# Patient Record
Sex: FEMALE | Race: NATIVE HAWAIIAN OR OTHER PACIFIC ISLANDER | Employment: UNEMPLOYED | URBAN - METROPOLITAN AREA
[De-identification: names, ages, dates, MRNs, and addresses within clinical notes are randomized per-mention and may not be internally consistent; named-entity substitution may affect disease eponyms.]

---

## 2017-01-04 ENCOUNTER — TELEPHONE (OUTPATIENT)
Dept: OBGYN CLINIC | Facility: CLINIC | Age: 27
End: 2017-01-04

## 2017-01-04 DIAGNOSIS — O24.419 GESTATIONAL DIABETES MELLITUS (GDM) IN SECOND TRIMESTER, GESTATIONAL DIABETES METHOD OF CONTROL UNSPECIFIED: Primary | ICD-10-CM

## 2017-01-04 NOTE — TELEPHONE ENCOUNTER
RC  And has questions for nurse okay to lv  PTS # [phone].      Also pt states she needs to spk with nurse about a referral - ( Dr. Mario Ricks Fax number # [phone] and ph # to office [phone] )

## 2017-01-04 NOTE — TELEPHONE ENCOUNTER
Pt informed of below and verbalized understanding. Pt states she made an appt with optho and needs a referral. Dr Rip Cheney is below. Routed to referral RNs.

## 2017-01-04 NOTE — TELEPHONE ENCOUNTER
LMTCB. Pt needs to be informed OLENA reviewed BS and BPs. Pt needs to be informed no change and fax by Monday to keep track of FBS.

## 2017-01-05 ENCOUNTER — TELEPHONE (OUTPATIENT)
Dept: PERINATAL CARE | Facility: HOSPITAL | Age: 27
End: 2017-01-05

## 2017-01-05 NOTE — TELEPHONE ENCOUNTER
PT STATES SHE CHOSE DR. PANCHAL BECAUSE NONE OF THE DRS HERE GAVE HER ANY SPECIFIC NAME. SHE CALLED HER INSURANCE TO FIND A NAME. DISCUSSED SEEING DR. Rylee Gonzalez HERE AT [facility]. PT DOES PREFER THIS.   SHE IS AWARE WE WILL PUT REFERRAL IN PLACE AND SHE CAN CALL T

## 2017-01-05 NOTE — TELEPHONE ENCOUNTER
Phone call to [facility] to request blood glucose log for MFM to review. [facility] states she will fax log tomorrow.

## 2017-01-07 ENCOUNTER — TELEPHONE (OUTPATIENT)
Dept: OBGYN CLINIC | Facility: CLINIC | Age: 27
End: 2017-01-07

## 2017-01-07 NOTE — TELEPHONE ENCOUNTER
Pt notified B/P and BS log has been reviewed by the doctor and increased the dinner and bedtime insulin as follows: 0 + 0 + 6 + 16 NPH. Pt instructed to fax next BS log in 3-4 days.

## 2017-01-09 ENCOUNTER — TELEPHONE (OUTPATIENT)
Dept: PERINATAL CARE | Facility: HOSPITAL | Age: 27
End: 2017-01-09

## 2017-01-09 NOTE — TELEPHONE ENCOUNTER
Dr. Swati Alvarez reviewed blood glucose log from 1/1/17 -1/6/17  Would like Marcy Cortes to increase her HS Insulin to 16 units of NPH  Angelita contacted by phone. Informed her of Dr. Timmy Scheuermann recommendation. Verbalized understanding and agrees with plan.   Will send log

## 2017-01-10 ENCOUNTER — OFFICE VISIT (OUTPATIENT)
Dept: OPTOMETRY | Facility: CLINIC | Age: 27
End: 2017-01-10

## 2017-01-10 DIAGNOSIS — O24.419 GESTATIONAL DIABETES MELLITUS (GDM) AFFECTING PREGNANCY: Primary | ICD-10-CM

## 2017-01-10 PROCEDURE — 92004 COMPRE OPH EXAM NEW PT 1/>: CPT | Performed by: OPTOMETRIST

## 2017-01-10 NOTE — PROGRESS NOTES
Ramya Bangura is a 31year old female. HPI:     HPI     Diabetic Eye Exam   Diabetes characteristics include Type 2, controlled with diet and on insulin. Duration of 6 months. Comments   Patient is in for a DFE.  Patient developed diabetes at th Rfl:    Insulin NPH, Human,, Isophane, 100 UNIT/ML Subcutaneous Suspension Inject 2 Units into the skin nightly. Disp: 1 vial Rfl: PRN   Insulin Lispro (HUMALOG) 100 UNIT/ML Subcutaneous Solution Inject 2 Units into the skin Before Dinner.  Disp: 1 vial Rfl Wearing Rx      Sphere Cylinder Axis   Right -5.25 -0.50 175   Left -4.75 -0.25 180         Manifest Refraction (Auto)      Sphere Cylinder Axis   Right -5.00 -0.50 180   Left -4.25 -0.50 180                    ASSESSMENT/PLAN:     Diagnoses and Plan:

## 2017-01-10 NOTE — PATIENT INSTRUCTIONS
Gestational diabetes mellitus (GDM) affecting pregnancy  I advised patient that there is no background diabetic retinopathy in either eye and that they should continue to keep their blood sugar under control and continue to see their physician as directed.

## 2017-01-10 NOTE — ASSESSMENT & PLAN NOTE
I advised patient that there is no background diabetic retinopathy in either eye and that they should continue to keep their blood sugar under control and continue to see their physician as directed. I stressed the importance of yearly diabetic eye exams.

## 2017-01-16 ENCOUNTER — ROUTINE PRENATAL (OUTPATIENT)
Dept: OBGYN CLINIC | Facility: CLINIC | Age: 27
End: 2017-01-16

## 2017-01-16 VITALS
WEIGHT: 214 LBS | HEART RATE: 98 BPM | DIASTOLIC BLOOD PRESSURE: 84 MMHG | BODY MASS INDEX: 40 KG/M2 | SYSTOLIC BLOOD PRESSURE: 126 MMHG

## 2017-01-16 DIAGNOSIS — Z34.03 ENCOUNTER FOR SUPERVISION OF NORMAL FIRST PREGNANCY IN THIRD TRIMESTER: Primary | ICD-10-CM

## 2017-01-16 LAB
APPEARANCE: CLEAR
MULTISTIX LOT#: NORMAL NUMERIC
URINE-COLOR: YELLOW

## 2017-01-18 NOTE — PROGRESS NOTES
Wishes for TDAP at next visit. Increase insulin to 0+0+6+18N. Next growth u/s 1/30. Fax sugars every 3-4 days.  RTC 2 wks

## 2017-01-19 ENCOUNTER — TELEPHONE (OUTPATIENT)
Dept: PERINATAL CARE | Facility: HOSPITAL | Age: 27
End: 2017-01-19

## 2017-01-19 NOTE — TELEPHONE ENCOUNTER
Jonny Celestin called at home, requested copy of her blood glucose log for Dr. Ravi Solano to review. She will email the log today. Again requested that she email the log weekly. Patient agreed.   States she is feeling well

## 2017-01-20 ENCOUNTER — TELEPHONE (OUTPATIENT)
Dept: OBGYN CLINIC | Facility: CLINIC | Age: 27
End: 2017-01-20

## 2017-01-20 RX ORDER — INSULIN ASPART 100 [IU]/ML
6 INJECTION, SOLUTION INTRAVENOUS; SUBCUTANEOUS
Qty: 1 VIAL | Status: ON HOLD
Start: 2017-01-20 | End: 2017-03-23

## 2017-01-20 RX ORDER — BLOOD SUGAR DIAGNOSTIC
STRIP MISCELLANEOUS
Qty: 1 BOX | Status: SHIPPED | OUTPATIENT
Start: 2017-01-20 | End: 2017-05-03 | Stop reason: ALTCHOICE

## 2017-01-20 RX ORDER — INSULIN ASPART 100 [IU]/ML
6 INJECTION, SOLUTION INTRAVENOUS; SUBCUTANEOUS
Qty: 1 VIAL | Status: SHIPPED | OUTPATIENT
Start: 2017-01-20 | End: 2017-01-20

## 2017-01-20 NOTE — TELEPHONE ENCOUNTER
LEFT MESSAGE AT HOME # THAT REFILL AUTHORIZATIONS WERE FAXED TO HER PHARMACY BUT TO CALL IF THERE ARE ANY PROBLEMS.

## 2017-01-20 NOTE — TELEPHONE ENCOUNTER
Pt is requesting a redill on novalg and Novolin, and syringes pls note she  needs the dosage up date it, and a 90 day supply

## 2017-01-24 ENCOUNTER — TELEPHONE (OUTPATIENT)
Dept: PERINATAL CARE | Facility: HOSPITAL | Age: 27
End: 2017-01-24

## 2017-01-26 ENCOUNTER — TELEPHONE (OUTPATIENT)
Dept: OBGYN CLINIC | Facility: CLINIC | Age: 27
End: 2017-01-26

## 2017-01-26 NOTE — TELEPHONE ENCOUNTER
Pt informed KCB reviewed BS log and BP log and increased insulin regimen to 0 + 0 + 6 + 20N. Pt instructed to fax or call next BS and BP log in 3-4 days. Pt verbalized understanding.

## 2017-01-26 NOTE — TELEPHONE ENCOUNTER
Results received and placed on [facility] desk for review

## 2017-01-30 ENCOUNTER — TELEPHONE (OUTPATIENT)
Dept: OBGYN CLINIC | Facility: CLINIC | Age: 27
End: 2017-01-30

## 2017-01-30 ENCOUNTER — HOSPITAL ENCOUNTER (OUTPATIENT)
Dept: PERINATAL CARE | Facility: HOSPITAL | Age: 27
Discharge: HOME | End: 2017-01-30
Attending: OBSTETRICS & GYNECOLOGY
Payer: COMMERCIAL

## 2017-01-30 ENCOUNTER — ROUTINE PRENATAL (OUTPATIENT)
Dept: OBGYN CLINIC | Facility: CLINIC | Age: 27
End: 2017-01-30

## 2017-01-30 VITALS
WEIGHT: 214 LBS | BODY MASS INDEX: 40 KG/M2 | RESPIRATION RATE: 18 BRPM | HEART RATE: 90 BPM | SYSTOLIC BLOOD PRESSURE: 129 MMHG | DIASTOLIC BLOOD PRESSURE: 93 MMHG

## 2017-01-30 VITALS
SYSTOLIC BLOOD PRESSURE: 129 MMHG | BODY MASS INDEX: 41 KG/M2 | HEART RATE: 106 BPM | DIASTOLIC BLOOD PRESSURE: 90 MMHG | WEIGHT: 219 LBS

## 2017-01-30 DIAGNOSIS — O24.419 GESTATIONAL DIABETES MELLITUS (GDM) AFFECTING PREGNANCY: ICD-10-CM

## 2017-01-30 DIAGNOSIS — Z34.93 ENCOUNTER FOR SUPERVISION OF NORMAL PREGNANCY IN THIRD TRIMESTER, UNSPECIFIED GRAVIDITY: Primary | ICD-10-CM

## 2017-01-30 DIAGNOSIS — Z34.93: ICD-10-CM

## 2017-01-30 DIAGNOSIS — O10.919 CHRONIC HYPERTENSION AFFECTING PREGNANCY: Primary | ICD-10-CM

## 2017-01-30 DIAGNOSIS — O10.919 CHRONIC HYPERTENSION AFFECTING PREGNANCY: ICD-10-CM

## 2017-01-30 DIAGNOSIS — O99.213 OBESITY AFFECTING PREGNANCY, ANTEPARTUM, THIRD TRIMESTER: ICD-10-CM

## 2017-01-30 DIAGNOSIS — IMO0001 INSULIN DEPENDENT GESTATIONAL DIABETES MELLITUS (GDM), ANTEPARTUM: ICD-10-CM

## 2017-01-30 LAB
MULTISTIX LOT#: NORMAL NUMERIC
PH, URINE: 6 (ref 4.5–8)
SPECIFIC GRAVITY: 1.03 (ref 1–1.03)
UROBILINOGEN,SEMI-QN: 0.2 MG/DL (ref 0–1.9)

## 2017-01-30 PROCEDURE — 76805 OB US >/= 14 WKS SNGL FETUS: CPT | Performed by: OBSTETRICS & GYNECOLOGY

## 2017-01-30 PROCEDURE — 76805 OB US >/= 14 WKS SNGL FETUS: CPT

## 2017-01-30 PROCEDURE — 99214 OFFICE O/P EST MOD 30 MIN: CPT | Performed by: OBSTETRICS & GYNECOLOGY

## 2017-01-30 PROCEDURE — 76819 FETAL BIOPHYS PROFIL W/O NST: CPT | Performed by: OBSTETRICS & GYNECOLOGY

## 2017-01-30 NOTE — TELEPHONE ENCOUNTER
Reviewed pt recs on prob list after her visit and there is a mention of NST's at 32 weeks and at 36 weeks- please advise her that we would like for her to start at 32 weeks due to DM and HTN. Please give her info to schedule starting at 32 weeks.

## 2017-01-30 NOTE — TELEPHONE ENCOUNTER
Please clarify. Does pt need weekly NST starting at 32wks or starting at 36wks, or growth u/s at 32 and 36 wks.

## 2017-01-30 NOTE — PROGRESS NOTES
MFM follow up outpatient consultation -  Obesity; chronic HTN, probable pre-existing diabetes    S: no HA, no swelling, good FM.  No low blood sugars.   We discussed that if she has recurrent mildly elevated blood sugars after lunch, therefore I recommend implications associated with chronic hypertension.  She expressed understanding that chronic hypertension increases the risk of pre-eclampsia, placental abruption, IUGR and fetal demise and possible need for  delivery.  The signs and symptoms of pre have higher rates of small for gestational age infants. Jose Angel Austin had her questions answered to her satisfaction.      IMPRESSION:   1. IUP at 30w1d    2. Normal fetal growth and anatomy  3. Maternal obesity complicating pregnancy (BMI 38.9)  4.  Hypertens

## 2017-01-30 NOTE — PROGRESS NOTES
MFM growth ultrasound. Patient is A2GDM. Blood glucose log given to Dr. Shanell Mathews. Good fetal movement.

## 2017-01-30 NOTE — PROGRESS NOTES
0+2+6+20N- lunch BS's values elevated, start NST's at 32 weeks, fax BS's in 4 days, had DMG US this afternoon- results pending

## 2017-01-31 NOTE — TELEPHONE ENCOUNTER
Magui WALLER on [facility] notified that pt will be coming for weekly NSTs starting at 32 weeks. Pt informed of recs below and verbalized understanding.  Pt stated she is driving right now and would like a message sent to her via [employer] with the phone number for [facility]

## 2017-02-14 ENCOUNTER — APPOINTMENT (OUTPATIENT)
Dept: OBGYN CLINIC | Facility: HOSPITAL | Age: 27
End: 2017-02-14
Payer: COMMERCIAL

## 2017-02-14 ENCOUNTER — HOSPITAL ENCOUNTER (OUTPATIENT)
Facility: HOSPITAL | Age: 27
Discharge: HOME | End: 2017-02-14
Attending: OBSTETRICS & GYNECOLOGY | Admitting: OBSTETRICS & GYNECOLOGY
Payer: COMMERCIAL

## 2017-02-14 ENCOUNTER — ROUTINE PRENATAL (OUTPATIENT)
Dept: OBGYN CLINIC | Facility: CLINIC | Age: 27
End: 2017-02-14

## 2017-02-14 VITALS
DIASTOLIC BLOOD PRESSURE: 89 MMHG | SYSTOLIC BLOOD PRESSURE: 138 MMHG | HEART RATE: 114 BPM | WEIGHT: 221 LBS | BODY MASS INDEX: 42 KG/M2

## 2017-02-14 VITALS
DIASTOLIC BLOOD PRESSURE: 90 MMHG | SYSTOLIC BLOOD PRESSURE: 138 MMHG | RESPIRATION RATE: 18 BRPM | HEART RATE: 93 BPM | TEMPERATURE: 99 F

## 2017-02-14 DIAGNOSIS — Z34.03 ENCOUNTER FOR SUPERVISION OF NORMAL FIRST PREGNANCY IN THIRD TRIMESTER: Primary | ICD-10-CM

## 2017-02-14 LAB
GLUCOSE (URINE DIPSTICK): 2000 MG/DL
MULTISTIX LOT#: NORMAL NUMERIC

## 2017-02-14 PROCEDURE — 90715 TDAP VACCINE 7 YRS/> IM: CPT | Performed by: OBSTETRICS & GYNECOLOGY

## 2017-02-14 PROCEDURE — 59025 FETAL NON-STRESS TEST: CPT | Performed by: OBSTETRICS & GYNECOLOGY

## 2017-02-14 PROCEDURE — 90471 IMMUNIZATION ADMIN: CPT | Performed by: OBSTETRICS & GYNECOLOGY

## 2017-02-14 NOTE — PROGRESS NOTES
No issues reported. To [facility] for NST now. Continue q monthly growth. BS log reviewed. Will inc to 0+2+8+22N. BP log reviewed and 120s/80-80-90s. RTC 2 wks. Continue BP and BS log q3-4d. Patient to send in via my chart. TDAP today. RTC 2wks.

## 2017-02-15 NOTE — NST
Nonstress Test   Patient: Anna Marie Lennon    Gestation: 32w2d    NST:       Variability: Moderate           Accelerations: Yes           Decelerations: Variable            Baseline: 130 BPM           Uterine Irritability: No           Contractions: Not present

## 2017-02-17 ENCOUNTER — TELEPHONE (OUTPATIENT)
Dept: PERINATAL CARE | Facility: HOSPITAL | Age: 27
End: 2017-02-17

## 2017-02-19 NOTE — PROGRESS NOTES
NST note:    Indication: Gestational Diabetes    FHT baseline: 130    Variability: moderate    Accels:  present    Decels: No    Tocos:  Yes    Category: 1 tracing    Plan: Weekly NST's

## 2017-02-20 ENCOUNTER — HOSPITAL ENCOUNTER (OUTPATIENT)
Facility: HOSPITAL | Age: 27
Discharge: HOME | End: 2017-02-20
Attending: OBSTETRICS & GYNECOLOGY | Admitting: OBSTETRICS & GYNECOLOGY
Payer: COMMERCIAL

## 2017-02-20 ENCOUNTER — TELEPHONE (OUTPATIENT)
Dept: OBGYN CLINIC | Facility: CLINIC | Age: 27
End: 2017-02-20

## 2017-02-20 ENCOUNTER — APPOINTMENT (OUTPATIENT)
Dept: OBGYN CLINIC | Facility: HOSPITAL | Age: 27
End: 2017-02-20
Payer: COMMERCIAL

## 2017-02-20 VITALS — RESPIRATION RATE: 16 BRPM | HEART RATE: 97 BPM | DIASTOLIC BLOOD PRESSURE: 83 MMHG | SYSTOLIC BLOOD PRESSURE: 128 MMHG

## 2017-02-20 PROCEDURE — 59025 FETAL NON-STRESS TEST: CPT | Performed by: OBSTETRICS & GYNECOLOGY

## 2017-02-20 NOTE — NST
Nonstress Test   Patient: Tonie Bob    Gestation: 33w1d    NST: chronic htn, A2GDM, High BMI,        Variability: Moderate           Accelerations: Yes           Decelerations: None            Baseline: 133 BPM           Uterine Irritability: No

## 2017-02-20 NOTE — TELEPHONE ENCOUNTER
Likely from shaving. Warm compress. Avoid shaving and deodorant.   If itching  Okay for 1% hydrocortisone cream.

## 2017-02-20 NOTE — TELEPHONE ENCOUNTER
33w1d c/o of left underarm rash that come on a couple of days ago. Pt describes area as \"being irritated, and raised bumpy rash\" that is in less than 1/2 of her underarm.   Pt states has not been applying deodorant or lotion to area d/t symptoms and has n

## 2017-02-27 ENCOUNTER — PATIENT MESSAGE (OUTPATIENT)
Dept: OBGYN CLINIC | Facility: CLINIC | Age: 27
End: 2017-02-27

## 2017-02-27 NOTE — TELEPHONE ENCOUNTER
From: Haleigh Tapia  To: Niki Estrella DO  Sent: 2/27/2017 9:57 AM CST  Subject: Other    2/20 / N / 90 / 103 / 97 / 122 // 0 + 2 + 8 + 22N  2/21 / N / 94 / 119 / 102 / 110 // 0 + 2 + 8 + 22N  2/22 / N / 96 /102 / 110 / 126 // 0 + 2 + 8 + 22N  2/23 / N

## 2017-02-28 ENCOUNTER — ROUTINE PRENATAL (OUTPATIENT)
Dept: OBGYN CLINIC | Facility: CLINIC | Age: 27
End: 2017-02-28

## 2017-02-28 ENCOUNTER — HOSPITAL ENCOUNTER (OUTPATIENT)
Facility: HOSPITAL | Age: 27
Discharge: HOME | End: 2017-02-28
Attending: OBSTETRICS & GYNECOLOGY | Admitting: OBSTETRICS & GYNECOLOGY
Payer: COMMERCIAL

## 2017-02-28 ENCOUNTER — HOSPITAL ENCOUNTER (OUTPATIENT)
Dept: PERINATAL CARE | Facility: HOSPITAL | Age: 27
Discharge: HOME | End: 2017-02-28
Attending: OBSTETRICS & GYNECOLOGY
Payer: COMMERCIAL

## 2017-02-28 ENCOUNTER — HOSPITAL ENCOUNTER (OUTPATIENT)
Dept: OBGYN CLINIC | Facility: HOSPITAL | Age: 27
Discharge: HOME | End: 2017-02-28
Payer: COMMERCIAL

## 2017-02-28 VITALS — DIASTOLIC BLOOD PRESSURE: 86 MMHG | HEART RATE: 120 BPM | SYSTOLIC BLOOD PRESSURE: 148 MMHG

## 2017-02-28 VITALS
WEIGHT: 225 LBS | BODY MASS INDEX: 43 KG/M2 | DIASTOLIC BLOOD PRESSURE: 85 MMHG | SYSTOLIC BLOOD PRESSURE: 135 MMHG | HEART RATE: 111 BPM

## 2017-02-28 VITALS — SYSTOLIC BLOOD PRESSURE: 136 MMHG | DIASTOLIC BLOOD PRESSURE: 90 MMHG | HEART RATE: 103 BPM

## 2017-02-28 DIAGNOSIS — O10.919 CHRONIC HYPERTENSION AFFECTING PREGNANCY: ICD-10-CM

## 2017-02-28 DIAGNOSIS — O24.419 GESTATIONAL DIABETES MELLITUS (GDM) AFFECTING PREGNANCY: ICD-10-CM

## 2017-02-28 DIAGNOSIS — O99.213 OBESITY AFFECTING PREGNANCY, ANTEPARTUM, THIRD TRIMESTER: ICD-10-CM

## 2017-02-28 DIAGNOSIS — Z34.83 ENCOUNTER FOR SUPERVISION OF OTHER NORMAL PREGNANCY IN THIRD TRIMESTER: Primary | ICD-10-CM

## 2017-02-28 DIAGNOSIS — IMO0001 INSULIN DEPENDENT GESTATIONAL DIABETES MELLITUS (GDM), ANTEPARTUM: Primary | ICD-10-CM

## 2017-02-28 DIAGNOSIS — IMO0001 INSULIN DEPENDENT GESTATIONAL DIABETES MELLITUS (GDM), ANTEPARTUM: ICD-10-CM

## 2017-02-28 DIAGNOSIS — R03.0 ELEVATED BLOOD PRESSURE READING WITHOUT DIAGNOSIS OF HYPERTENSION: ICD-10-CM

## 2017-02-28 LAB
APPEARANCE: CLEAR
MULTISTIX LOT#: NORMAL NUMERIC
PH, URINE: 6 (ref 4.5–8)
SPECIFIC GRAVITY: 1.02 (ref 1–1.03)
URINE-COLOR: YELLOW

## 2017-02-28 PROCEDURE — 76819 FETAL BIOPHYS PROFIL W/O NST: CPT | Performed by: OBSTETRICS & GYNECOLOGY

## 2017-02-28 PROCEDURE — 76805 OB US >/= 14 WKS SNGL FETUS: CPT

## 2017-02-28 PROCEDURE — 59025 FETAL NON-STRESS TEST: CPT | Performed by: OBSTETRICS & GYNECOLOGY

## 2017-02-28 PROCEDURE — 76805 OB US >/= 14 WKS SNGL FETUS: CPT | Performed by: OBSTETRICS & GYNECOLOGY

## 2017-02-28 PROCEDURE — 99214 OFFICE O/P EST MOD 30 MIN: CPT | Performed by: OBSTETRICS & GYNECOLOGY

## 2017-02-28 NOTE — PROGRESS NOTES
MFM follow up outpatient consultation -  Obesity; chronic HTN, probable pre-existing diabetes    S: no HA, no swelling, good FM.  No low blood sugars.   We discussed that if she has recurrent mildly elevated blood sugars fasting and after diner.   FBS impro abnormalities.                Discussion:         We reviewed potential implications associated with chronic hypertension.  She expressed understanding that chronic hypertension increases the risk of pre-eclampsia, placental abruption, IUGR and fetal demise complications.  Women with weight loss or insufficient weight gain have higher rates of small for gestational age infants. Tristin Anival had her questions answered to her satisfaction.      IMPRESSION:    1. IUP at 34w2d    2.  Normal fetal growth and anatomy

## 2017-02-28 NOTE — NST
Nonstress Test   Patient: Aurelia Parra    Gestation: 34w2d    NST: High BMI, A2GDM, PIH       Variability: Moderate           Accelerations: Yes           Decelerations: None            Baseline: 135 BPM           Uterine Irritability: No           Contract

## 2017-03-01 NOTE — PROGRESS NOTES
Itching on back of hands and feet for a week, not palms and soles. Insulin changed yesterday to 0+2+10+24 N. Getting 2x/wk NST. RTC 2 wk.

## 2017-03-01 NOTE — ADDENDUM NOTE
Encounter addended by: Ethridge Schwab on: 3/1/2017 10:39 AM<BR>     Documentation filed: Charges VN

## 2017-03-03 ENCOUNTER — HOSPITAL ENCOUNTER (OUTPATIENT)
Facility: HOSPITAL | Age: 27
Discharge: HOME | End: 2017-03-03
Attending: OBSTETRICS & GYNECOLOGY | Admitting: OBSTETRICS & GYNECOLOGY
Payer: COMMERCIAL

## 2017-03-03 ENCOUNTER — HOSPITAL ENCOUNTER (OUTPATIENT)
Dept: OBGYN CLINIC | Facility: HOSPITAL | Age: 27
Discharge: HOME | End: 2017-03-03
Payer: COMMERCIAL

## 2017-03-03 VITALS — HEART RATE: 99 BPM | SYSTOLIC BLOOD PRESSURE: 119 MMHG | DIASTOLIC BLOOD PRESSURE: 86 MMHG

## 2017-03-03 PROCEDURE — 59025 FETAL NON-STRESS TEST: CPT | Performed by: OBSTETRICS & GYNECOLOGY

## 2017-03-04 NOTE — TRIAGE
[facility] HOSP - [facility]      Triage Note    Susan Barreto Patient Status:  Outpatient in a Bed    1990 MRN [id number]   Location [address] C-D Attending Verona Walker MD   Hosp Day # 0 PCP Timo Delong MD       [facility] OB decides plan of care. Instructions and handout given for pre eclampsia. Will continue to monitor blood glucose and send to M on Monday. All patient questions answered. She verbalized understanding. Reason for visit: NST for A2GDM and high BMI.

## 2017-03-07 ENCOUNTER — HOSPITAL ENCOUNTER (INPATIENT)
Facility: HOSPITAL | Age: 27
LOS: 1 days | Discharge: HOME | DRG: 781 | End: 2017-03-08
Attending: OBSTETRICS & GYNECOLOGY | Admitting: OBSTETRICS & GYNECOLOGY
Payer: COMMERCIAL

## 2017-03-07 ENCOUNTER — HOSPITAL ENCOUNTER (OUTPATIENT)
Dept: OBGYN CLINIC | Facility: HOSPITAL | Age: 27
Discharge: HOME | DRG: 781 | End: 2017-03-07
Payer: COMMERCIAL

## 2017-03-07 ENCOUNTER — TELEPHONE (OUTPATIENT)
Dept: OBGYN CLINIC | Facility: CLINIC | Age: 27
End: 2017-03-07

## 2017-03-07 ENCOUNTER — TELEPHONE (OUTPATIENT)
Dept: PERINATAL CARE | Facility: HOSPITAL | Age: 27
End: 2017-03-07

## 2017-03-07 DIAGNOSIS — O11.9 PREECLAMPSIA COMPLICATING HYPERTENSION: Primary | ICD-10-CM

## 2017-03-07 PROBLEM — I10 CHRONIC HYPERTENSION: Status: ACTIVE | Noted: 2017-03-07

## 2017-03-07 LAB
ALT SERPL-CCNC: 21 U/L (ref 14–54)
AST SERPL-CCNC: 26 U/L (ref 15–41)
BASOPHILS # BLD: 0.1 K/UL (ref 0–0.2)
BASOPHILS NFR BLD: 1 %
CREAT UR-MCNC: 153.4 MG/DL
EOSINOPHIL # BLD: 0.1 K/UL (ref 0–0.7)
EOSINOPHIL NFR BLD: 1 %
ERYTHROCYTE [DISTWIDTH] IN BLOOD BY AUTOMATED COUNT: 13.7 % (ref 11–15)
GLUCOSE BLDC GLUCOMTR-MCNC: 83 MG/DL (ref 70–99)
HCT VFR BLD AUTO: 42.8 % (ref 35–48)
HGB BLD-MCNC: 14.2 G/DL (ref 12–16)
LYMPHOCYTES # BLD: 2 K/UL (ref 1–4)
LYMPHOCYTES NFR BLD: 16 %
MCH RBC QN AUTO: 29.5 PG (ref 27–32)
MCHC RBC AUTO-ENTMCNC: 33.2 G/DL (ref 32–37)
MCV RBC AUTO: 88.7 FL (ref 80–100)
MICROALBUMIN UR-MCNC: 8.5 MG/DL (ref 0–1.8)
MICROALBUMIN/CREAT UR: 55.4 MG/G{CREAT} (ref 0–20)
MONOCYTES # BLD: 1 K/UL (ref 0–1)
MONOCYTES NFR BLD: 8 %
NEUTROPHILS # BLD AUTO: 9.1 K/UL (ref 1.8–7.7)
NEUTROPHILS NFR BLD: 74 %
PLATELET # BLD AUTO: 275 K/UL (ref 140–400)
PMV BLD AUTO: 8.2 FL (ref 7.4–10.3)
RBC # BLD AUTO: 4.82 M/UL (ref 3.7–5.4)
URATE SERPL-MCNC: 5.6 MG/DL (ref 2.1–7.4)
WBC # BLD AUTO: 12.3 K/UL (ref 4–11)

## 2017-03-07 RX ORDER — SODIUM CHLORIDE 0.9 % (FLUSH) 0.9 %
10 SYRINGE (ML) INJECTION AS NEEDED
Status: DISCONTINUED | OUTPATIENT
Start: 2017-03-07 | End: 2017-03-08

## 2017-03-07 RX ORDER — LABETALOL HYDROCHLORIDE 5 MG/ML
20 INJECTION, SOLUTION INTRAVENOUS
Status: DISCONTINUED | OUTPATIENT
Start: 2017-03-07 | End: 2017-03-08

## 2017-03-07 NOTE — TELEPHONE ENCOUNTER
Please call pt if she did not submit a BP log as requested when she was in [facility] triage over the weekend

## 2017-03-07 NOTE — H&P
[address] Patient Status:  Outpatient in a Bed    1990 MRN [id number]   Location [address] C-D Attending Merry Hagen MD   Hosp Day # 0 PCP Nellie Paz MD     Date of Admission protein            200, cr Cl 1.7Pregestational DM:   Baseline 24hr            urine CrCl / Protein, HbA1c, Opthal baseline 1st &            3rd trimester,  Fetal echo (12/28/16 normal) level            2 U/S,             Serial MFM growth u/s, delivery 39- Grandmother    • Diabetes Paternal Grandfather    • lung cancer[other] [OTHER] Maternal Aunt    • Glaucoma Neg      Social History:    Smoking status: Former Smoker     Quit date: 07/01/2016    Smokeless tobacco: Never Used    Alcohol Use: Yes  0.0 oz/week Novolin NPH covered.) ) Disp: 1 vial Rfl: prn 3/3/2017 at Unknown time   [facility] LANCETS 30U Does not apply Misc  Disp:  Rfl:  3/3/2017 at Unknown time   [address] In Vitro Strip  Disp:  Rfl:  3/3/2017 at Unknown time   Merla Loss In Vitro ASSESSMENT:    1. Chronic HTN, r/o pre eclampsia  2. A2GDM    PLAN:    1. Admit for observation. Serial BP.  24 hr urine protein. Repeat labs in am  2.  FHTs reassuring         Angela Gibson  3/7/2017  5:50 PM

## 2017-03-08 ENCOUNTER — APPOINTMENT (OUTPATIENT)
Dept: PERINATAL CARE | Facility: HOSPITAL | Age: 27
DRG: 781 | End: 2017-03-08
Attending: OBSTETRICS & GYNECOLOGY
Payer: COMMERCIAL

## 2017-03-08 VITALS
HEIGHT: 61 IN | SYSTOLIC BLOOD PRESSURE: 144 MMHG | TEMPERATURE: 98 F | HEART RATE: 90 BPM | RESPIRATION RATE: 17 BRPM | DIASTOLIC BLOOD PRESSURE: 93 MMHG

## 2017-03-08 LAB
ALBUMIN SERPL BCP-MCNC: 2.6 G/DL (ref 3.5–4.8)
ALBUMIN/GLOB SERPL: 0.8 {RATIO} (ref 1–2)
ALP SERPL-CCNC: 104 U/L (ref 32–100)
ALT SERPL-CCNC: 18 U/L (ref 14–54)
ANION GAP SERPL CALC-SCNC: 7 MMOL/L (ref 0–18)
AST SERPL-CCNC: 21 U/L (ref 15–41)
BASOPHILS # BLD: 0.1 K/UL (ref 0–0.2)
BASOPHILS NFR BLD: 1 %
BILIRUB SERPL-MCNC: 0.6 MG/DL (ref 0.3–1.2)
BUN SERPL-MCNC: 10 MG/DL (ref 8–20)
BUN/CREAT SERPL: 17.5 (ref 10–20)
CALCIUM SERPL-MCNC: 9.2 MG/DL (ref 8.5–10.5)
CHLORIDE SERPL-SCNC: 107 MMOL/L (ref 95–110)
CO2 SERPL-SCNC: 23 MMOL/L (ref 22–32)
CREAT SERPL-MCNC: 0.57 MG/DL (ref 0.5–1.5)
EOSINOPHIL # BLD: 0.1 K/UL (ref 0–0.7)
EOSINOPHIL NFR BLD: 1 %
ERYTHROCYTE [DISTWIDTH] IN BLOOD BY AUTOMATED COUNT: 13.8 % (ref 11–15)
GLOBULIN PLAS-MCNC: 3.1 G/DL (ref 2.5–3.7)
GLUCOSE BLDC GLUCOMTR-MCNC: 112 MG/DL (ref 70–99)
GLUCOSE BLDC GLUCOMTR-MCNC: 85 MG/DL (ref 70–99)
GLUCOSE BLDC GLUCOMTR-MCNC: 91 MG/DL (ref 70–99)
GLUCOSE SERPL-MCNC: 90 MG/DL (ref 70–99)
HCT VFR BLD AUTO: 41.5 % (ref 35–48)
HGB BLD-MCNC: 13.8 G/DL (ref 12–16)
LYMPHOCYTES # BLD: 3.1 K/UL (ref 1–4)
LYMPHOCYTES NFR BLD: 24 %
MCH RBC QN AUTO: 30 PG (ref 27–32)
MCHC RBC AUTO-ENTMCNC: 33.3 G/DL (ref 32–37)
MCV RBC AUTO: 90.2 FL (ref 80–100)
MONOCYTES # BLD: 1.5 K/UL (ref 0–1)
MONOCYTES NFR BLD: 12 %
NEUTROPHILS # BLD AUTO: 7.7 K/UL (ref 1.8–7.7)
NEUTROPHILS NFR BLD: 54 %
NEUTS BAND NFR BLD: 7 %
OSMOLALITY UR CALC.SUM OF ELEC: 283 MOSM/KG (ref 275–295)
PLATELET # BLD AUTO: 255 K/UL (ref 140–400)
PMV BLD AUTO: 8.8 FL (ref 7.4–10.3)
POTASSIUM SERPL-SCNC: 4 MMOL/L (ref 3.3–5.1)
PROT 24H UR-MRATE: 193.6 MG/24H (ref 0–150)
PROT SERPL-MCNC: 5.7 G/DL (ref 5.9–8.4)
RBC # BLD AUTO: 4.6 M/UL (ref 3.7–5.4)
SODIUM SERPL-SCNC: 137 MMOL/L (ref 136–144)
SPECIMEN VOL 24H UR: 2420 ML/24H
T PALLIDUM AB SER QL: NEGATIVE
URATE SERPL-MCNC: 6 MG/DL (ref 2.1–7.4)
VARIANT LYMPHS NFR BLD MANUAL: 1 %
WBC # BLD AUTO: 12.6 K/UL (ref 4–11)

## 2017-03-08 PROCEDURE — 99223 1ST HOSP IP/OBS HIGH 75: CPT | Performed by: OBSTETRICS & GYNECOLOGY

## 2017-03-08 PROCEDURE — 76819 FETAL BIOPHYS PROFIL W/O NST: CPT

## 2017-03-08 RX ORDER — SODIUM CHLORIDE, SODIUM LACTATE, POTASSIUM CHLORIDE, CALCIUM CHLORIDE 600; 310; 30; 20 MG/100ML; MG/100ML; MG/100ML; MG/100ML
INJECTION, SOLUTION INTRAVENOUS
Status: COMPLETED
Start: 2017-03-08 | End: 2017-03-08

## 2017-03-08 RX ORDER — BETAMETHASONE SODIUM PHOSPHATE AND BETAMETHASONE ACETATE 3; 3 MG/ML; MG/ML
12 INJECTION, SUSPENSION INTRA-ARTICULAR; INTRALESIONAL; INTRAMUSCULAR; SOFT TISSUE EVERY 24 HOURS
Status: DISCONTINUED | OUTPATIENT
Start: 2017-03-08 | End: 2017-03-08

## 2017-03-08 RX ORDER — SODIUM CHLORIDE, SODIUM LACTATE, POTASSIUM CHLORIDE, CALCIUM CHLORIDE 600; 310; 30; 20 MG/100ML; MG/100ML; MG/100ML; MG/100ML
INJECTION, SOLUTION INTRAVENOUS CONTINUOUS
Status: DISCONTINUED | OUTPATIENT
Start: 2017-03-08 | End: 2017-03-08

## 2017-03-08 NOTE — PROGRESS NOTES
3/8/2017, 10:01 AM    Subjective:    No c/o    Objective:   03/07/17  2340 03/08/17  0245 03/08/17  0415 03/08/17  0600   BP: 136/83 121/77 129/80    Pulse: 97 93 96    Temp: 98.3 °F (36.8 °C) 98.5 °F (36.9 °C) 98.1 °F (36.7 °C) 98.4 °F (36.9 °C)   TempSrc

## 2017-03-08 NOTE — CONSULTS
[facility] Consultation    Date of Admission:  3/7/2017  Date of Consult:  3/8/2017    Reason for Consult:   Hypertension, probable superimposed preeclampsia    History of Present Illness:  Leon Stevens is a a(n 28 ago. She has never used smokeless tobacco. She reports that she drinks alcohol. She reports that she does not use illicit drugs.     Allergies:    Benadryl [Diphenhyd*    Hives  Other                   Swelling, Shortness of Breath    Comment:Bee sting    M if there are no signs of severe disease but delivery would be indicated any time after 34 weeks for severe features.       We discussed maternal risks which include but are not limited to: seizure, stroke, liver and or kidney dysfunction, placental abruptio summarized above. The approximate physician face-to-face time was 40 minutes. Discussed with Dr. Taya Mahmood.       Felton Gosselin, MD  3/8/2017  5:42 PM

## 2017-03-09 NOTE — PAYOR COMM NOTE
Attending Physician: No att. providers found    Review Type: ADMISSION   Reviewer:  Monik Corrales       Date: March 9, 2017 - 2:35 PM  Payor: [facility] [address] Number: [id number]  Admit date: 3/7/2017  4:08 PM   Admitte Plan delivery between 38-39 weeks for diabetes            and hypertension                         Per [facility] 12/29/16 MFM report: Weekly NST at 32            weeks / twice weekly at 34             wks                 Monthly growth ultrasound in [address] [place] [address]  Para Term  AB SAB TAB Ectopic Multiple Living   3    2 2          # Outcome Date GA Lbr Juve/2nd Weight Sex Delivery Anes PTL Lv   3 Current            2 16           1 16                Past Medical History:   Past Medi TWICE WEEKLY.    OK TO GIVE NOVALIN NPH IF HER INSURANCE COVERS IT INSTEAD AND GIVE 90 DAY SUPPLY IF INSURANCE COVERS IT THIS WAY Disp: 1 vial Rfl: PRN 3/3/2017 at Unknown time   Insulin NPH, Human,, Isophane, 100 UNIT/ML Subcutaneous Suspension Called in P Value Ref Range   Uric Acid 5.6 2.1-7.4 mg/dL   -CBC W/ DIFFERENTIAL   Result Value Ref Range   WBC 12.3 (H) 4.0-11.0 K/UL   RBC 4.82 3.70-5.40 M/UL   HGB 14.2 12.0-16.0 g/dL   HCT 42.8 35.0-48.0 %   MCV 88.7 80.0-100.0 fL   MCH 29.5 27.0-32.0 pg   MCHC 33

## 2017-03-09 NOTE — PROGRESS NOTES
DISCHARGE NOTE  HOME PER DR STRINGER W/ MFLEEANNA Mendez CONSULT POC. PT TO F/U ON FRI 3/10 AND 3/14 WITH MFM. PT INSTRUCTED TO CALL OB MAKE APPT WITHIN 1 WK. DR STRINGER AWARE OF 24 HR UA RESULT 183 AND LABS. MD AWARE PT U/C VIA TOCO. PT LUZ DARLING 1/40/-2.    DC HO

## 2017-03-10 ENCOUNTER — HOSPITAL ENCOUNTER (OUTPATIENT)
Facility: HOSPITAL | Age: 27
Discharge: HOME | End: 2017-03-10
Attending: OBSTETRICS & GYNECOLOGY | Admitting: OBSTETRICS & GYNECOLOGY
Payer: COMMERCIAL

## 2017-03-10 ENCOUNTER — APPOINTMENT (OUTPATIENT)
Dept: OBGYN CLINIC | Facility: HOSPITAL | Age: 27
End: 2017-03-10
Payer: COMMERCIAL

## 2017-03-10 VITALS — DIASTOLIC BLOOD PRESSURE: 89 MMHG | HEART RATE: 92 BPM | SYSTOLIC BLOOD PRESSURE: 124 MMHG

## 2017-03-10 LAB
ALT SERPL-CCNC: 17 U/L (ref 14–54)
AST SERPL-CCNC: 21 U/L (ref 15–41)
BASOPHILS # BLD: 0.1 K/UL (ref 0–0.2)
BASOPHILS NFR BLD: 1 %
EOSINOPHIL # BLD: 0.2 K/UL (ref 0–0.7)
EOSINOPHIL NFR BLD: 2 %
ERYTHROCYTE [DISTWIDTH] IN BLOOD BY AUTOMATED COUNT: 13.5 % (ref 11–15)
HCT VFR BLD AUTO: 41.4 % (ref 35–48)
HGB BLD-MCNC: 14.1 G/DL (ref 12–16)
LYMPHOCYTES # BLD: 2.3 K/UL (ref 1–4)
LYMPHOCYTES NFR BLD: 19 %
MCH RBC QN AUTO: 30.6 PG (ref 27–32)
MCHC RBC AUTO-ENTMCNC: 34.1 G/DL (ref 32–37)
MCV RBC AUTO: 89.7 FL (ref 80–100)
MONOCYTES # BLD: 1.2 K/UL (ref 0–1)
MONOCYTES NFR BLD: 10 %
NEUTROPHILS # BLD AUTO: 8.4 K/UL (ref 1.8–7.7)
NEUTROPHILS NFR BLD: 69 %
PLATELET # BLD AUTO: 259 K/UL (ref 140–400)
PMV BLD AUTO: 8.8 FL (ref 7.4–10.3)
RBC # BLD AUTO: 4.62 M/UL (ref 3.7–5.4)
WBC # BLD AUTO: 12.2 K/UL (ref 4–11)

## 2017-03-10 PROCEDURE — 59025 FETAL NON-STRESS TEST: CPT | Performed by: OBSTETRICS & GYNECOLOGY

## 2017-03-10 NOTE — TRIAGE
[facility] HOSP - [facility]      Triage Note    Nelly Partridge Patient Status:  Outpatient in a Bed    1990 MRN [id number]   Location [address] C-D Attending Yanet Quezada MD   Hosp Day # 0 PCP MD Luis Eduardo Fields return if she has any symptoms of preeclampsia. Handouts have been given and instructions reviewed. Patient and  verbalize understanding. To return next week for scheduled testing.      Reason for visit: Scheduled nst for A2GDM and gestational hype

## 2017-03-10 NOTE — PROGRESS NOTES
To triage for scheduled nst. A2GDM and gestational hypertension. Has been at home on bedrest since Wednesday when discharged from hospital.  Keeping b/p log at home. Will send blood glucose log to [facility] on Monday.   Denies headache, epigastric pain, visual

## 2017-03-14 ENCOUNTER — ROUTINE PRENATAL (OUTPATIENT)
Dept: OBGYN CLINIC | Facility: CLINIC | Age: 27
End: 2017-03-14

## 2017-03-14 ENCOUNTER — HOSPITAL ENCOUNTER (OUTPATIENT)
Dept: PERINATAL CARE | Facility: HOSPITAL | Age: 27
Discharge: HOME | End: 2017-03-14
Attending: OBSTETRICS & GYNECOLOGY
Payer: COMMERCIAL

## 2017-03-14 ENCOUNTER — HOSPITAL ENCOUNTER (OUTPATIENT)
Dept: PERINATAL CARE | Facility: HOSPITAL | Age: 27
Discharge: HOME | End: 2017-03-14
Attending: OBSTETRICS & GYNECOLOGY | Admitting: OBSTETRICS & GYNECOLOGY
Payer: COMMERCIAL

## 2017-03-14 ENCOUNTER — TELEPHONE (OUTPATIENT)
Dept: OBGYN CLINIC | Facility: CLINIC | Age: 27
End: 2017-03-14

## 2017-03-14 VITALS
HEART RATE: 118 BPM | DIASTOLIC BLOOD PRESSURE: 86 MMHG | SYSTOLIC BLOOD PRESSURE: 143 MMHG | WEIGHT: 230 LBS | BODY MASS INDEX: 43 KG/M2

## 2017-03-14 VITALS — HEART RATE: 104 BPM | SYSTOLIC BLOOD PRESSURE: 155 MMHG | DIASTOLIC BLOOD PRESSURE: 105 MMHG

## 2017-03-14 DIAGNOSIS — I10 CHRONIC HYPERTENSION: ICD-10-CM

## 2017-03-14 DIAGNOSIS — Z34.03 ENCOUNTER FOR SUPERVISION OF NORMAL FIRST PREGNANCY IN THIRD TRIMESTER: Primary | ICD-10-CM

## 2017-03-14 DIAGNOSIS — O99.213 OBESITY AFFECTING PREGNANCY, ANTEPARTUM, THIRD TRIMESTER: ICD-10-CM

## 2017-03-14 DIAGNOSIS — O13.3 GESTATIONAL HYPERTENSION WITHOUT SIGNIFICANT PROTEINURIA IN THIRD TRIMESTER: ICD-10-CM

## 2017-03-14 DIAGNOSIS — O24.419 GESTATIONAL DIABETES MELLITUS (GDM) AFFECTING PREGNANCY: ICD-10-CM

## 2017-03-14 DIAGNOSIS — IMO0001 INSULIN DEPENDENT GESTATIONAL DIABETES MELLITUS (GDM), ANTEPARTUM: ICD-10-CM

## 2017-03-14 DIAGNOSIS — O10.919 CHRONIC HYPERTENSION AFFECTING PREGNANCY: ICD-10-CM

## 2017-03-14 DIAGNOSIS — IMO0001 INSULIN DEPENDENT GESTATIONAL DIABETES MELLITUS (GDM), ANTEPARTUM: Primary | ICD-10-CM

## 2017-03-14 LAB
APPEARANCE: CLEAR
MULTISTIX LOT#: NORMAL NUMERIC
URINE-COLOR: YELLOW

## 2017-03-14 PROCEDURE — 99214 OFFICE O/P EST MOD 30 MIN: CPT | Performed by: OBSTETRICS & GYNECOLOGY

## 2017-03-14 PROCEDURE — 76819 FETAL BIOPHYS PROFIL W/O NST: CPT | Performed by: OBSTETRICS & GYNECOLOGY

## 2017-03-14 PROCEDURE — 76819 FETAL BIOPHYS PROFIL W/O NST: CPT

## 2017-03-14 NOTE — PROGRESS NOTES
Pt for bpp  HX of CHTN   a2gdm   Denies headaches visual dist epigastric pain  bilat patellar reflexes +1 clonus absent

## 2017-03-14 NOTE — PROGRESS NOTES
MFM follow up outpatient consultation -  Obesity; chronic HTN, probable pre-existing diabetes    S: no HA, stable swelling, good FM.  No low blood sugars.   Her home BP log was reviewed and her BP is stable (mosltly 130's/90's).   NO N/V, no abdominal pain, were reviewed today; her compliance with the recommended four times daily assessments (fasting and two-hour post-prandial) is excellent. Her overall glucose control is good.     Her compliance with her insulin regimen was reviewed and it is excellent.    H

## 2017-03-15 ENCOUNTER — TELEPHONE (OUTPATIENT)
Dept: OBGYN CLINIC | Facility: CLINIC | Age: 27
End: 2017-03-15

## 2017-03-15 NOTE — PROGRESS NOTES
No issues reported. BS log reviewed and continue 0+2+12+26N. BP log reviewed and BP stable. MFM recs IOL @ 37wks. IOL scheduled for Sunday at 6pm for cervidil. RTC Friday for ABI. Plan discussed with patient. GBS collected today.

## 2017-03-15 NOTE — TELEPHONE ENCOUNTER
Please notify pt of IOL scheduled for 6pm on Sunday night for cervical ripening. [facility]- The order set wouldn't allow me to put in her insulin doses. She is currently on 0+2+12+26N.  I ordered a drip for her while in labor, but was unable to place st

## 2017-03-17 ENCOUNTER — HOSPITAL ENCOUNTER (OUTPATIENT)
Facility: HOSPITAL | Age: 27
Discharge: HOME | End: 2017-03-17
Attending: OBSTETRICS & GYNECOLOGY | Admitting: OBSTETRICS & GYNECOLOGY
Payer: COMMERCIAL

## 2017-03-17 ENCOUNTER — ROUTINE PRENATAL (OUTPATIENT)
Dept: OBGYN CLINIC | Facility: CLINIC | Age: 27
End: 2017-03-17

## 2017-03-17 ENCOUNTER — HOSPITAL ENCOUNTER (OUTPATIENT)
Dept: OBGYN CLINIC | Facility: HOSPITAL | Age: 27
Discharge: HOME | End: 2017-03-17
Payer: COMMERCIAL

## 2017-03-17 VITALS — HEART RATE: 105 BPM | SYSTOLIC BLOOD PRESSURE: 146 MMHG | DIASTOLIC BLOOD PRESSURE: 97 MMHG

## 2017-03-17 VITALS
DIASTOLIC BLOOD PRESSURE: 88 MMHG | WEIGHT: 227 LBS | HEART RATE: 130 BPM | BODY MASS INDEX: 43 KG/M2 | SYSTOLIC BLOOD PRESSURE: 136 MMHG

## 2017-03-17 DIAGNOSIS — Z34.93 ENCOUNTER FOR SUPERVISION OF NORMAL PREGNANCY IN THIRD TRIMESTER, UNSPECIFIED GRAVIDITY: Primary | ICD-10-CM

## 2017-03-17 LAB
MULTISTIX LOT#: NORMAL NUMERIC
PH, URINE: 5.5 (ref 4.5–8)
PROTEIN (URINE DIPSTICK): 30 MG/DL
SPECIFIC GRAVITY: 1.02 (ref 1–1.03)
UROBILINOGEN,SEMI-QN: 0.2 MG/DL (ref 0–1.9)

## 2017-03-17 PROCEDURE — 59025 FETAL NON-STRESS TEST: CPT | Performed by: OBSTETRICS & GYNECOLOGY

## 2017-03-17 NOTE — NST
Nonstress Test   Patient: Maribel Bales    Gestation: 36w5d    NST:       Variability: Moderate           Accelerations: Yes           Decelerations: None            Baseline: 125 BPM           Uterine Irritability: No           Contractions: Irregular

## 2017-03-17 NOTE — PROGRESS NOTES
Neg GBS. No meds for BP. Current insulin 0+2+12+26N -- orders & IOL already set by [address] [place], [place] [address]sk St for 2 days from now.  No HA/ BV

## 2017-03-19 ENCOUNTER — HOSPITAL ENCOUNTER (INPATIENT)
Dept: OBGYN CLINIC | Facility: HOSPITAL | Age: 27
Discharge: HOME | End: 2017-03-19
Payer: COMMERCIAL

## 2017-03-19 ENCOUNTER — HOSPITAL ENCOUNTER (INPATIENT)
Facility: HOSPITAL | Age: 27
LOS: 4 days | Discharge: HOME | End: 2017-03-23
Attending: OBSTETRICS & GYNECOLOGY | Admitting: OBSTETRICS & GYNECOLOGY
Payer: COMMERCIAL

## 2017-03-19 PROBLEM — Z3A.37 37 WEEKS GESTATION OF PREGNANCY: Status: ACTIVE | Noted: 2017-03-19

## 2017-03-19 LAB
ALT SERPL-CCNC: 17 U/L (ref 14–54)
ANTIBODY SCREEN: NEGATIVE
AST SERPL-CCNC: 24 U/L (ref 15–41)
ERYTHROCYTE [DISTWIDTH] IN BLOOD BY AUTOMATED COUNT: 13.9 % (ref 11–15)
GLUCOSE BLDC GLUCOMTR-MCNC: 100 MG/DL (ref 70–99)
HCT VFR BLD AUTO: 40.4 % (ref 35–48)
HGB BLD-MCNC: 13.5 G/DL (ref 12–16)
MCH RBC QN AUTO: 30 PG (ref 27–32)
MCHC RBC AUTO-ENTMCNC: 33.3 G/DL (ref 32–37)
MCV RBC AUTO: 90 FL (ref 80–100)
PLATELET # BLD AUTO: 275 K/UL (ref 140–400)
PMV BLD AUTO: 8.7 FL (ref 7.4–10.3)
RBC # BLD AUTO: 4.49 M/UL (ref 3.7–5.4)
RH BLOOD TYPE: POSITIVE
URATE SERPL-MCNC: 5.9 MG/DL (ref 2.1–7.4)
WBC # BLD AUTO: 11.9 K/UL (ref 4–11)

## 2017-03-19 PROCEDURE — 3E033VJ INTRODUCTION OF OTHER HORMONE INTO PERIPHERAL VEIN, PERCUTANEOUS APPROACH: ICD-10-PCS | Performed by: OBSTETRICS & GYNECOLOGY

## 2017-03-19 RX ORDER — DEXTROSE, SODIUM CHLORIDE, SODIUM LACTATE, POTASSIUM CHLORIDE, AND CALCIUM CHLORIDE 5; .6; .31; .03; .02 G/100ML; G/100ML; G/100ML; G/100ML; G/100ML
125 INJECTION, SOLUTION INTRAVENOUS CONTINUOUS
Status: DISCONTINUED | OUTPATIENT
Start: 2017-03-19 | End: 2017-03-21

## 2017-03-19 RX ORDER — AMMONIA INHALANTS 0.04 G/.3ML
0.3 INHALANT RESPIRATORY (INHALATION) AS NEEDED
Status: DISCONTINUED | OUTPATIENT
Start: 2017-03-19 | End: 2017-03-21

## 2017-03-19 RX ORDER — IBUPROFEN 600 MG/1
600 TABLET ORAL ONCE AS NEEDED
Status: DISPENSED | OUTPATIENT
Start: 2017-03-19 | End: 2017-03-21

## 2017-03-19 RX ORDER — LABETALOL HYDROCHLORIDE 5 MG/ML
INJECTION, SOLUTION INTRAVENOUS
Status: DISCONTINUED
Start: 2017-03-19 | End: 2017-03-19 | Stop reason: WASHOUT

## 2017-03-19 RX ORDER — LIDOCAINE HYDROCHLORIDE 10 MG/ML
30 INJECTION, SOLUTION EPIDURAL; INFILTRATION; INTRACAUDAL; PERINEURAL ONCE
Status: DISCONTINUED | OUTPATIENT
Start: 2017-03-19 | End: 2017-03-21

## 2017-03-19 RX ORDER — LABETALOL HYDROCHLORIDE 5 MG/ML
20 INJECTION, SOLUTION INTRAVENOUS
Status: DISCONTINUED | OUTPATIENT
Start: 2017-03-19 | End: 2017-03-21

## 2017-03-19 RX ORDER — TERBUTALINE SULFATE 1 MG/ML
0.25 INJECTION, SOLUTION SUBCUTANEOUS AS NEEDED
Status: DISCONTINUED | OUTPATIENT
Start: 2017-03-19 | End: 2017-03-21

## 2017-03-19 RX ORDER — SODIUM CHLORIDE 0.9 % (FLUSH) 0.9 %
10 SYRINGE (ML) INJECTION AS NEEDED
Status: DISCONTINUED | OUTPATIENT
Start: 2017-03-19 | End: 2017-03-21

## 2017-03-19 RX ORDER — SODIUM CHLORIDE, SODIUM LACTATE, POTASSIUM CHLORIDE, CALCIUM CHLORIDE 600; 310; 30; 20 MG/100ML; MG/100ML; MG/100ML; MG/100ML
INJECTION, SOLUTION INTRAVENOUS
Status: DISPENSED
Start: 2017-03-19 | End: 2017-03-20

## 2017-03-19 RX ORDER — ONDANSETRON 2 MG/ML
4 INJECTION INTRAMUSCULAR; INTRAVENOUS EVERY 6 HOURS PRN
Status: DISCONTINUED | OUTPATIENT
Start: 2017-03-19 | End: 2017-03-21

## 2017-03-20 ENCOUNTER — ANESTHESIA (OUTPATIENT)
Dept: OBGYN CLINIC | Facility: HOSPITAL | Age: 27
End: 2017-03-20

## 2017-03-20 ENCOUNTER — TELEPHONE (OUTPATIENT)
Dept: PERINATAL CARE | Facility: HOSPITAL | Age: 27
End: 2017-03-20

## 2017-03-20 LAB
GLUCOSE BLDC GLUCOMTR-MCNC: 139 MG/DL (ref 70–99)
GLUCOSE BLDC GLUCOMTR-MCNC: 65 MG/DL (ref 70–99)
GLUCOSE BLDC GLUCOMTR-MCNC: 74 MG/DL (ref 70–99)
GLUCOSE BLDC GLUCOMTR-MCNC: 77 MG/DL (ref 70–99)
GLUCOSE BLDC GLUCOMTR-MCNC: 81 MG/DL (ref 70–99)
GLUCOSE BLDC GLUCOMTR-MCNC: 82 MG/DL (ref 70–99)
GLUCOSE BLDC GLUCOMTR-MCNC: 83 MG/DL (ref 70–99)
GLUCOSE BLDC GLUCOMTR-MCNC: 90 MG/DL (ref 70–99)
GLUCOSE BLDC GLUCOMTR-MCNC: 93 MG/DL (ref 70–99)

## 2017-03-20 RX ORDER — SODIUM CHLORIDE, SODIUM LACTATE, POTASSIUM CHLORIDE, CALCIUM CHLORIDE 600; 310; 30; 20 MG/100ML; MG/100ML; MG/100ML; MG/100ML
INJECTION, SOLUTION INTRAVENOUS
Status: COMPLETED
Start: 2017-03-20 | End: 2017-03-20

## 2017-03-20 NOTE — H&P
[facility] Patient Status:  Inpatient    1990 MRN [id number]   Location [address] C-D Attending Chris Mcgovern MD   Hosp Day # 0 PCP Thelma Joyner MD     Date of Admission:  3/ Benadryl [Diphenhyd*    Hives  Other                   Swelling, Shortness of Breath    Comment:Bee sting  Medications:    Prescriptions prior to admission:  insulin aspart (NOVOLOG) 100 UNIT/ML Subcutaneous Solution Inject 6 Units into the skin daily skin Before Dinner.  (Patient taking differently: Inject 2 Units into the skin daily before lunch.  ) Disp: 1 vial Rfl: PRN Taking       Review of Systems:   As documented in HPI      Physical Exam:        Constitutional: alert and cooperative in No distres

## 2017-03-20 NOTE — PLAN OF CARE
Pt. Verbalized understanding of induction process, medications utilized, and reports feeling less anxious.

## 2017-03-20 NOTE — PROGRESS NOTES
[facility]    Labor Progress Note    Maribel Bales Patient Status:  Inpatient    1990 MRN [id number]   Location [address] C-D Attending Javier Charles MD   Hosp Day # 1 PCP MD Gino Zheng Chronic hypertension affecting pregnancy  BP's stable overnight at rest    37 weeks gestation of pregnancy  IOL per MFM recs due to worsening BP's          Plan discussed with patient who verbalizes understanding and agreement. Ivet TRAN  Page  3/20/201

## 2017-03-21 ENCOUNTER — ANESTHESIA EVENT (OUTPATIENT)
Dept: OBGYN UNIT | Facility: HOSPITAL | Age: 27
End: 2017-03-21
Payer: COMMERCIAL

## 2017-03-21 ENCOUNTER — ANESTHESIA (OUTPATIENT)
Dept: OBGYN UNIT | Facility: HOSPITAL | Age: 27
End: 2017-03-21
Payer: COMMERCIAL

## 2017-03-21 ENCOUNTER — ANESTHESIA EVENT (OUTPATIENT)
Dept: OBGYN CLINIC | Facility: HOSPITAL | Age: 27
End: 2017-03-21

## 2017-03-21 LAB
GLUCOSE BLDC GLUCOMTR-MCNC: 100 MG/DL (ref 70–99)
GLUCOSE BLDC GLUCOMTR-MCNC: 102 MG/DL (ref 70–99)
GLUCOSE BLDC GLUCOMTR-MCNC: 105 MG/DL (ref 70–99)
GLUCOSE BLDC GLUCOMTR-MCNC: 175 MG/DL (ref 70–99)
GLUCOSE BLDC GLUCOMTR-MCNC: 202 MG/DL (ref 70–99)
GLUCOSE BLDC GLUCOMTR-MCNC: 72 MG/DL (ref 70–99)
GLUCOSE BLDC GLUCOMTR-MCNC: 91 MG/DL (ref 70–99)
GLUCOSE BLDC GLUCOMTR-MCNC: 94 MG/DL (ref 70–99)

## 2017-03-21 PROCEDURE — 59400 OBSTETRICAL CARE: CPT | Performed by: OBSTETRICS & GYNECOLOGY

## 2017-03-21 PROCEDURE — 0HQ9XZZ REPAIR PERINEUM SKIN, EXTERNAL APPROACH: ICD-10-PCS | Performed by: OBSTETRICS & GYNECOLOGY

## 2017-03-21 RX ORDER — AMMONIA INHALANTS 0.04 G/.3ML
0.3 INHALANT RESPIRATORY (INHALATION) AS NEEDED
Status: DISCONTINUED | OUTPATIENT
Start: 2017-03-21 | End: 2017-03-23

## 2017-03-21 RX ORDER — LIDOCAINE HYDROCHLORIDE 10 MG/ML
INJECTION, SOLUTION EPIDURAL; INFILTRATION; INTRACAUDAL; PERINEURAL
Status: COMPLETED
Start: 2017-03-21 | End: 2017-03-21

## 2017-03-21 RX ORDER — PHENYLEPHRINE HCL IN 0.9% NACL 0.5 MG/5ML
SYRINGE (ML) INTRAVENOUS
Status: DISCONTINUED
Start: 2017-03-21 | End: 2017-03-21 | Stop reason: WASHOUT

## 2017-03-21 RX ORDER — BUPIVACAINE HYDROCHLORIDE 2.5 MG/ML
INJECTION, SOLUTION EPIDURAL; INFILTRATION; INTRACAUDAL
Status: COMPLETED
Start: 2017-03-21 | End: 2017-03-21

## 2017-03-21 RX ORDER — EPHEDRINE SULFATE/0.9% NACL/PF 25 MG/5 ML
SYRINGE (ML) INTRAVENOUS
Status: DISCONTINUED
Start: 2017-03-21 | End: 2017-03-21 | Stop reason: WASHOUT

## 2017-03-21 RX ORDER — DOCUSATE SODIUM 100 MG/1
100 CAPSULE, LIQUID FILLED ORAL 2 TIMES DAILY
Status: DISCONTINUED | OUTPATIENT
Start: 2017-03-21 | End: 2017-03-23

## 2017-03-21 RX ORDER — HYDROCODONE BITARTRATE AND ACETAMINOPHEN 5; 325 MG/1; MG/1
1 TABLET ORAL EVERY 6 HOURS PRN
Status: DISCONTINUED | OUTPATIENT
Start: 2017-03-21 | End: 2017-03-23

## 2017-03-21 RX ORDER — ONDANSETRON 2 MG/ML
4 INJECTION INTRAMUSCULAR; INTRAVENOUS EVERY 6 HOURS PRN
Status: DISCONTINUED | OUTPATIENT
Start: 2017-03-21 | End: 2017-03-23

## 2017-03-21 RX ORDER — SODIUM CHLORIDE, SODIUM LACTATE, POTASSIUM CHLORIDE, CALCIUM CHLORIDE 600; 310; 30; 20 MG/100ML; MG/100ML; MG/100ML; MG/100ML
INJECTION, SOLUTION INTRAVENOUS
Status: COMPLETED
Start: 2017-03-21 | End: 2017-03-21

## 2017-03-21 RX ORDER — LIDOCAINE HYDROCHLORIDE AND EPINEPHRINE 20; 5 MG/ML; UG/ML
INJECTION, SOLUTION EPIDURAL; INFILTRATION; INTRACAUDAL; PERINEURAL
Status: DISCONTINUED
Start: 2017-03-21 | End: 2017-03-21 | Stop reason: WASHOUT

## 2017-03-21 RX ORDER — IBUPROFEN 600 MG/1
600 TABLET ORAL EVERY 6 HOURS PRN
Status: DISCONTINUED | OUTPATIENT
Start: 2017-03-21 | End: 2017-03-23

## 2017-03-21 RX ORDER — SIMETHICONE 80 MG
80 TABLET,CHEWABLE ORAL 3 TIMES DAILY PRN
Status: DISCONTINUED | OUTPATIENT
Start: 2017-03-21 | End: 2017-03-23

## 2017-03-21 RX ORDER — SODIUM CHLORIDE 0.9 % (FLUSH) 0.9 %
10 SYRINGE (ML) INJECTION AS NEEDED
Status: DISCONTINUED | OUTPATIENT
Start: 2017-03-21 | End: 2017-03-23

## 2017-03-21 RX ORDER — DIAPER,BRIEF,INFANT-TODD,DISP
1 EACH MISCELLANEOUS EVERY 6 HOURS PRN
Status: DISCONTINUED | OUTPATIENT
Start: 2017-03-21 | End: 2017-03-23

## 2017-03-21 RX ADMIN — BUPIVACAINE HYDROCHLORIDE 12 MG: 2.5 INJECTION, SOLUTION EPIDURAL; INFILTRATION; INTRACAUDAL at 08:27:00

## 2017-03-21 RX ADMIN — LIDOCAINE HYDROCHLORIDE 5 ML: 10 INJECTION, SOLUTION EPIDURAL; INFILTRATION; INTRACAUDAL; PERINEURAL at 08:23:00

## 2017-03-21 NOTE — PROGRESS NOTES
[facility] HOSP - [facility]    Vaginal Delivery Note    Susan Bilkem Patient Status:  Inpatient    1990 MRN [id number]   Location [address] C-D Attending Verona Walker MD   Hosp Day # 2 PCP Timo Delong MD     Delivery     Infant

## 2017-03-21 NOTE — PROGRESS NOTES
3/20/2017, 8:15 PM    Subjective:  Patient is overall very comfortable with Pitocin going. Cervix was checked and she made very little change today. Reviewed with patient options of breaking or continuing pitocin. Pt would like a break to shower and eat.

## 2017-03-21 NOTE — ANESTHESIA POSTPROCEDURE EVALUATION
Patient: Ramya Sveta    Procedure Summary     Date Anesthesia Start Anesthesia Stop Room / Location    03/21/17 0823 1413        Procedure Diagnosis Scheduled Providers Responsible Provider    ANESTHESIA LABOR ANALGESIA No diagnosis on file.   Primo Chapman

## 2017-03-21 NOTE — ANESTHESIA PREPROCEDURE EVALUATION
Anesthesia PreOp Note    HPI:     Trisha Esposito is a 28year old female who presents for preoperative consultation requested by: * No surgeons listed *    Date of Surgery: * No dates entered *    * No procedures listed *  Indication: * No pre-op diagnosis en states that Novolin NPH covered.) (Patient taking differently: Inject 26 Units into the skin nightly. Called in Pharmacist, Eric Riley. Called in Novolin NPH vial. Sig: Take 10 units of NPH at bedtime. Subject to change the dosage. (Pharmacist states that [employer] (ammonia) nasal solution 0.3 mL 0.3 mL Nasal PRN Kathy Bremer, DO     citric acid-sodium citrate (BICITRA) solution 30 mL 30 mL Oral PRN Kathy Bremer, DO     dextrose 5 % /lactated ringers infusion 125 mL/hr Intravenous Continuous Buck Villanueva date: 07/01/2016    Smokeless tobacco: Never Used    Alcohol Use: Yes  0.0 oz/week    0 Standard drinks or equivalent per week         Comment: socially prior to pregnancy    Drug Use: No    Sexual Activity: Yes    Partners: Male     Other Topics Concern ROS     GI/Hepatic/Renal - negative ROS     Endo/Other - negative ROS   Abdominal  - normal exam  (+) obese,              Anesthesia Plan:   ASA:  3  Plan:   Epidural  Informed Consent Plan and Risks Discussed With:  Patient      I have informed Cecillia Messenger

## 2017-03-21 NOTE — PROGRESS NOTES
3/21/2017, 9:36 AM    Subjective:  Patient is now comfortable with an epidural.     Objective:   03/21/17  0850 03/21/17  0900 03/21/17  0920 03/21/17  0930   BP: 132/82 131/82 124/73 131/71   Pulse: 94 97 106 111   Temp:  98.5 °F (36.9 °C)     TempSrc:  O

## 2017-03-21 NOTE — ANESTHESIA PROCEDURE NOTES
Labor Analgesia  Performed by: Yair Branch  Authorized by: Yair Branch    Start Time:  3/21/2017 8:23 AM  End Time:  3/21/2017 8:35 AM  Site Identification: surface landmarks    Reason for Block: labor epidural    Anesthesiologist:  Miguel A Stratton

## 2017-03-21 NOTE — PLAN OF CARE
ANXIETY    • Will report anxiety at manageable levels Progressing        PAIN - ADULT    • Verbalizes/displays adequate comfort level or patient's stated pain goal Progressing        POSTPARTUM    • Long Term Goal:Experiences normal postpartum course Progr

## 2017-03-21 NOTE — DISCHARGE SUMMARY
[facility] HOSP - [facility]    Discharge Summary    Ana Auguste Patient Status:  Inpatient    1990 MRN [id number]   Location [address] C-D Attending Annamaria Dance, MD   Hosp Day # 2       Admit date:  3/19/2017    Discharge date: 3/23

## 2017-03-22 LAB
BASOPHILS # BLD: 0.1 K/UL (ref 0–0.2)
BASOPHILS NFR BLD: 0 %
EOSINOPHIL # BLD: 0.1 K/UL (ref 0–0.7)
EOSINOPHIL NFR BLD: 1 %
ERYTHROCYTE [DISTWIDTH] IN BLOOD BY AUTOMATED COUNT: 14 % (ref 11–15)
GLUCOSE BLDC GLUCOMTR-MCNC: 144 MG/DL (ref 70–99)
GLUCOSE BLDC GLUCOMTR-MCNC: 81 MG/DL (ref 70–99)
GLUCOSE BLDC GLUCOMTR-MCNC: 94 MG/DL (ref 70–99)
HCT VFR BLD AUTO: 32.5 % (ref 35–48)
HGB BLD-MCNC: 11 G/DL (ref 12–16)
LYMPHOCYTES # BLD: 3.2 K/UL (ref 1–4)
LYMPHOCYTES NFR BLD: 19 %
MCH RBC QN AUTO: 30.3 PG (ref 27–32)
MCHC RBC AUTO-ENTMCNC: 33.7 G/DL (ref 32–37)
MCV RBC AUTO: 89.8 FL (ref 80–100)
MONOCYTES # BLD: 1.5 K/UL (ref 0–1)
MONOCYTES NFR BLD: 9 %
NEUTROPHILS # BLD AUTO: 12 K/UL (ref 1.8–7.7)
NEUTROPHILS NFR BLD: 71 %
PLATELET # BLD AUTO: 224 K/UL (ref 140–400)
PMV BLD AUTO: 9 FL (ref 7.4–10.3)
RBC # BLD AUTO: 3.62 M/UL (ref 3.7–5.4)
WBC # BLD AUTO: 16.9 K/UL (ref 4–11)

## 2017-03-22 NOTE — LACTATION NOTE
This note was copied from the chart of Elvia Barnes.   LACTATION NOTE - INFANT    Evaluation Type  Evaluation Type: Inpatient    Problems & Assessment  Infant Assessment: Minimal hunger cues present;Skin color: pink or appropriate for ethnicity  Muscle tone:

## 2017-03-22 NOTE — PROGRESS NOTES
Post-Partum Note   3/22/2017, 8:53 AM    Subjective:  Patient doing well. Pain mild. Lochia is small. She reports she does tolerate a regular diet. She denies headache, fever, chest pain, shortness of breath, and leg pain.   She has ambulated and denies

## 2017-03-22 NOTE — PLAN OF CARE
BIRTH - VAGINAL/ SECTION    • Fetal and maternal status remain reassuring during the birth process Completed          ANXIETY    • Will report anxiety at manageable levels Progressing        PAIN - ADULT    • Verbalizes/displays adequate comfort le

## 2017-03-22 NOTE — LACTATION NOTE
LACTATION NOTE - MOTHER           Problems identified  Problems identified: Knowledge deficit  Problems Identified Other:  (chronic hypertension, insulin-dependent gestational diabetes)         Breastfeeding goal  Breastfeeding goal: To maintain breast mil

## 2017-03-23 VITALS
TEMPERATURE: 99 F | HEART RATE: 89 BPM | HEIGHT: 61 IN | WEIGHT: 226 LBS | OXYGEN SATURATION: 98 % | SYSTOLIC BLOOD PRESSURE: 124 MMHG | BODY MASS INDEX: 42.67 KG/M2 | DIASTOLIC BLOOD PRESSURE: 92 MMHG | RESPIRATION RATE: 16 BRPM

## 2017-03-23 LAB
GLUCOSE BLDC GLUCOMTR-MCNC: 108 MG/DL (ref 70–99)
GLUCOSE BLDC GLUCOMTR-MCNC: 87 MG/DL (ref 70–99)
GLUCOSE BLDC GLUCOMTR-MCNC: 90 MG/DL (ref 70–99)

## 2017-03-23 NOTE — PROGRESS NOTES
Discharge order received from md. Postpartum discharge folder given, discharge medication form reviewed, signed, and given to patient. Id bands matched with baby band. Patient informed with to  Make follow appointment with ob.  Mother is interacting appropr

## 2017-03-23 NOTE — LACTATION NOTE
This note was copied from the chart of Elvia Barnes.   LACTATION NOTE - INFANT    Evaluation Type  Evaluation Type: Inpatient    Problems & Assessment  Problems Diagnosed or Identified: Shallow latch;37-38 weeks gestation  Infant Assessment: Minimal hunger c

## 2017-03-23 NOTE — LACTATION NOTE
LACTATION NOTE - MOTHER           Problems identified  Problems identified: Knowledge deficit;Milk supply not WNL  Milk supply not WNL: Reduced (potential)  Problems Identified Other: infant BS wnl and completed    Maternal history  Maternal history: [place]

## 2017-03-23 NOTE — PLAN OF CARE
Problem: POSTPARTUM  Goal: Optimize infant feeding at the breast  INTERVENTIONS:  - Initiate breast feeding within first hour after birth. - Monitor effectiveness of current breast feeding efforts. - Assess support systems available to mother/family.   - experience with breast feeding.  - Provide information as needed about early infant feeding cues (e.g., rooting, lip smacking, sucking fingers/hand) versus late cue of crying.  - Discuss/demonstrate breast feeding aids (e.g., infant sling, nursing footstoo

## 2017-03-23 NOTE — PLAN OF CARE
ANXIETY    • Will report anxiety at manageable levels Adequate for Discharge        BREAST FEEDING    • Optimize infant feeding at the breast Adequate for Discharge        INADEQUATE LATCH, SUCK OR SWALLOW    • Demonstrate ability to latch and sustain latc

## 2017-04-03 ENCOUNTER — TELEPHONE (OUTPATIENT)
Dept: OBGYN CLINIC | Facility: CLINIC | Age: 27
End: 2017-04-03

## 2017-04-03 NOTE — TELEPHONE ENCOUNTER
Dagoberto Aquino  calling needs to spk w nurse would like to discuss why pt stopped working on 3/7/17 and delivered March 20th, she states there would only be an exception unless Dr documented otherwise or decied to pull her out before / ph [phone] press option

## 2017-05-03 ENCOUNTER — POSTPARTUM (OUTPATIENT)
Dept: OBGYN CLINIC | Facility: CLINIC | Age: 27
End: 2017-05-03

## 2017-05-03 VITALS
DIASTOLIC BLOOD PRESSURE: 85 MMHG | SYSTOLIC BLOOD PRESSURE: 140 MMHG | HEART RATE: 76 BPM | WEIGHT: 206.63 LBS | BODY MASS INDEX: 39 KG/M2

## 2017-05-03 PROBLEM — IMO0001 INSULIN DEPENDENT GESTATIONAL DIABETES MELLITUS (GDM), ANTEPARTUM: Status: RESOLVED | Noted: 2017-01-30 | Resolved: 2017-05-03

## 2017-05-03 PROBLEM — Z3A.37 37 WEEKS GESTATION OF PREGNANCY: Status: RESOLVED | Noted: 2017-03-19 | Resolved: 2017-05-03

## 2017-05-03 NOTE — PROGRESS NOTES
IZABELA Florez is a 28year old female  here for 6 week post-partum visit. Patient delivered a  female infant on [address] ). Patient desires Withdrawal and rhythm. for contraception. Patient is breast feeding.    Patient denies symptom

## 2017-07-06 ENCOUNTER — TELEPHONE (OUTPATIENT)
Dept: INTERNAL MEDICINE CLINIC | Facility: CLINIC | Age: 27
End: 2017-07-06

## 2017-07-06 ENCOUNTER — OFFICE VISIT (OUTPATIENT)
Dept: INTERNAL MEDICINE CLINIC | Facility: CLINIC | Age: 27
End: 2017-07-06

## 2017-07-06 VITALS
BODY MASS INDEX: 39.84 KG/M2 | TEMPERATURE: 100 F | WEIGHT: 211 LBS | OXYGEN SATURATION: 98 % | DIASTOLIC BLOOD PRESSURE: 84 MMHG | SYSTOLIC BLOOD PRESSURE: 139 MMHG | HEIGHT: 61 IN

## 2017-07-06 DIAGNOSIS — O24.414 INSULIN CONTROLLED GESTATIONAL DIABETES MELLITUS (GDM) IN FIRST TRIMESTER: Primary | ICD-10-CM

## 2017-07-06 DIAGNOSIS — M25.539 WRIST PAIN, ACUTE, UNSPECIFIED LATERALITY: ICD-10-CM

## 2017-07-06 PROCEDURE — 99214 OFFICE O/P EST MOD 30 MIN: CPT | Performed by: INTERNAL MEDICINE

## 2017-07-06 PROCEDURE — 99212 OFFICE O/P EST SF 10 MIN: CPT | Performed by: INTERNAL MEDICINE

## 2017-07-06 NOTE — PATIENT INSTRUCTIONS
ASSESSMENT/PLAN:   Insulin controlled gestational diabetes mellitus (gdm) in first trimester  (primary encounter diagnosis) Check fasting blood. Wrist pain, acute, unspecified laterality Check blood. ? Tendonitis. Try ibuprofen 600  Mg every 12 hrs.  Wi

## 2017-07-06 NOTE — PROGRESS NOTES
HPI:    Patient ID: William Wang is a 28year old female. Protein in urine. 37  Weeks was delivered. Baby is doing good. Was on insulin for Gestational DM. But also watching B/P's.        Wrist Pain    The pain is present in the right hand and left wrist No results found for: VITD      There are no preventive care reminders to display for this patient. Current Outpatient Prescriptions:  prenatal multivitamin plus DHA 27-0.8-228 MG Oral Cap Take 1 capsule by mouth daily.  Disp:  Rfl:       Past Medic SAB0  TAB0  Ectopic0  Multiple0  Live Births1        Hx of Pap: all Paps normal          Review of Systems   Constitutional: Negative for activity change, appetite change, chills, diaphoresis, fatigue, fever and unexpected weight change.    Respiratory: Ne Left elbow: She exhibits normal range of motion, no swelling, no effusion, no deformity and no laceration. No tenderness found. No radial head, no medial epicondyle, no lateral epicondyle and no olecranon process tenderness noted.         Left wrist: S

## 2017-07-06 NOTE — TELEPHONE ENCOUNTER
Want me to enter order for 2 hr. OGTT? Pt. Due per pt. I also wanted some blood work. 4 months post partum. Breast feeding. Gestational Dm and on insulin.

## 2017-07-07 NOTE — TELEPHONE ENCOUNTER
Spoke with Dr. Carmen Krishnan this AM. Orders were written for all testing. Does not need 2 hr. GTT. She can go fastin to get blood done.

## 2017-07-07 NOTE — TELEPHONE ENCOUNTER
Message left for pt that Dr. Corie Currie spoke with Dr. Luly Galvez and all labs are written and pt does not need to have GTT test, pt just needs to fast before having labs drawn.

## 2018-01-17 ENCOUNTER — OFFICE VISIT (OUTPATIENT)
Dept: INTERNAL MEDICINE CLINIC | Facility: CLINIC | Age: 28
End: 2018-01-17

## 2018-01-17 VITALS
TEMPERATURE: 99 F | BODY MASS INDEX: 39.84 KG/M2 | WEIGHT: 211 LBS | RESPIRATION RATE: 18 BRPM | HEIGHT: 61 IN | SYSTOLIC BLOOD PRESSURE: 135 MMHG | HEART RATE: 98 BPM | DIASTOLIC BLOOD PRESSURE: 85 MMHG

## 2018-01-17 DIAGNOSIS — J06.9 URI, ACUTE: Primary | ICD-10-CM

## 2018-01-17 PROCEDURE — 99212 OFFICE O/P EST SF 10 MIN: CPT | Performed by: INTERNAL MEDICINE

## 2018-01-17 PROCEDURE — 99214 OFFICE O/P EST MOD 30 MIN: CPT | Performed by: INTERNAL MEDICINE

## 2018-01-17 RX ORDER — FLUTICASONE PROPIONATE 50 MCG
2 SPRAY, SUSPENSION (ML) NASAL DAILY
Qty: 1 BOTTLE | Refills: 0 | Status: ON HOLD | OUTPATIENT
Start: 2018-01-17 | End: 2019-11-29

## 2018-01-18 NOTE — PATIENT INSTRUCTIONS
Butler Peabody, acute  (primary encounter diagnosis)strep throat negative , advised to drink plenty of fluids , flonase nsal spray, steam can help with congestion, tylenol as needed , if not better or if any fever , chills,productive cough, call us    htn- bp noted h

## 2018-01-18 NOTE — PROGRESS NOTES
HPI:    Patient ID: Ana Auguste is a 28year old female. HPI she came in today complaining of congestion, sore throat and left ear ache on and off for 3 days.   According to  her symptoms started on Sunday , congestion, runny nose for last for  2 days, c Prescriptions:  Fluticasone Propionate 50 MCG/ACT Nasal Suspension 2 sprays by Each Nare route daily. Disp: 1 Bottle Rfl: 0   prenatal multivitamin plus DHA 27-0.8-228 MG Oral Cap Take 1 capsule by mouth daily.  Disp:  Rfl:      Allergies:  Benadryl [Diphen mucous membranes are normal. Mucous membranes are not cyanotic. No oropharyngeal exudate, posterior oropharyngeal edema or posterior oropharyngeal erythema.    Minimal congestion   Eyes: Conjunctivae and EOM are normal. Pupils are equal, round, and reactive pregnancy- advised her to check bp at home and call with bp readings within one week, advised for low salt diet , aerobic exercise , if not better call us   No orders of the defined types were placed in this encounter.       Meds This Visit:  Signed [employer]

## 2018-02-08 ENCOUNTER — PATIENT MESSAGE (OUTPATIENT)
Dept: INTERNAL MEDICINE CLINIC | Facility: CLINIC | Age: 28
End: 2018-02-08

## 2018-02-12 ENCOUNTER — OFFICE VISIT (OUTPATIENT)
Dept: INTERNAL MEDICINE CLINIC | Facility: CLINIC | Age: 28
End: 2018-02-12

## 2018-02-12 VITALS
DIASTOLIC BLOOD PRESSURE: 68 MMHG | HEIGHT: 61 IN | BODY MASS INDEX: 39.27 KG/M2 | SYSTOLIC BLOOD PRESSURE: 128 MMHG | HEART RATE: 84 BPM | TEMPERATURE: 98 F | WEIGHT: 208 LBS

## 2018-02-12 DIAGNOSIS — H65.02 ACUTE SEROUS OTITIS MEDIA OF LEFT EAR, RECURRENCE NOT SPECIFIED: Primary | ICD-10-CM

## 2018-02-12 DIAGNOSIS — H66.92 OTITIS OF LEFT EAR: ICD-10-CM

## 2018-02-12 PROCEDURE — 99212 OFFICE O/P EST SF 10 MIN: CPT | Performed by: INTERNAL MEDICINE

## 2018-02-12 PROCEDURE — 99213 OFFICE O/P EST LOW 20 MIN: CPT | Performed by: INTERNAL MEDICINE

## 2018-02-12 RX ORDER — AMOXICILLIN 875 MG/1
875 TABLET, COATED ORAL 2 TIMES DAILY
Qty: 20 TABLET | Refills: 0 | Status: SHIPPED | OUTPATIENT
Start: 2018-02-12 | End: 2018-03-01 | Stop reason: ALTCHOICE

## 2018-02-12 NOTE — PATIENT INSTRUCTIONS
cute serous otitis media of left ear, recurrence not specified  (primary encounter diagnosis)-we will give her amoxicillin 875 for 10 days and take as directed with food til done.  Take probiotics while on antibiotics if can to prevent yeast infections, if

## 2018-02-12 NOTE — PROGRESS NOTES
HPI:    Patient ID: Trinity Mclain is a 28year old female. HPI she came in complaining of left ear ache.   According to her aprox one month ago she had congestion , cold symptoms and mild ear ache resolved after few days she was feeling better , and then a adenopathy. Does not bruise/bleed easily. Psychiatric/Behavioral: Negative for agitation, behavioral problems, confusion, hallucinations and sleep disturbance. The patient is not nervous/anxious.             Current Outpatient Prescriptions:  amoxicillin swelling. No drainage or tenderness. No mastoid tenderness. Tympanic membrane is erythematous. Nose: Nose normal. Right sinus exhibits no maxillary sinus tenderness and no frontal sinus tenderness.  Left sinus exhibits no maxillary sinus tenderness and no reviewed. ASSESSMENT/PLAN:   Acute serous otitis media of left ear, recurrence not specified  (primary encounter diagnosis)-we will give her amoxicillin 875 for 10 days and take as directed with food til done.  Take probiotics while on antibiotics

## 2018-02-16 ENCOUNTER — PATIENT MESSAGE (OUTPATIENT)
Dept: INTERNAL MEDICINE CLINIC | Facility: CLINIC | Age: 28
End: 2018-02-16

## 2018-02-16 NOTE — TELEPHONE ENCOUNTER
Placed follow up call to patient, patient did not answer. Left detailed message that patient needs to call office back for an important message from Dr. Jan Baig.

## 2018-02-16 NOTE — TELEPHONE ENCOUNTER
Patient states that she does not have any ear pain, no discomfort, ears were draining at the beginning of infection but now feels dryer, denies any sinus issues, denies headaches, no neck pain, no fever.  Call placed to Dr. Piotr Fleming, per Dr. Piotr Fleming if she jeovanny

## 2018-02-23 ENCOUNTER — OFFICE VISIT (OUTPATIENT)
Dept: OTOLARYNGOLOGY | Facility: CLINIC | Age: 28
End: 2018-02-23

## 2018-02-23 VITALS
SYSTOLIC BLOOD PRESSURE: 141 MMHG | TEMPERATURE: 99 F | WEIGHT: 205 LBS | BODY MASS INDEX: 38.71 KG/M2 | HEIGHT: 61 IN | DIASTOLIC BLOOD PRESSURE: 97 MMHG

## 2018-02-23 DIAGNOSIS — H60.311 CHRONIC DIFFUSE OTITIS EXTERNA OF RIGHT EAR: Primary | ICD-10-CM

## 2018-02-23 PROCEDURE — 99212 OFFICE O/P EST SF 10 MIN: CPT | Performed by: OTOLARYNGOLOGY

## 2018-02-23 PROCEDURE — 69210 REMOVE IMPACTED EAR WAX UNI: CPT | Performed by: OTOLARYNGOLOGY

## 2018-02-23 PROCEDURE — 99243 OFF/OP CNSLTJ NEW/EST LOW 30: CPT | Performed by: OTOLARYNGOLOGY

## 2018-02-23 RX ORDER — NEOMYCIN SULFATE, POLYMYXIN B SULFATE AND HYDROCORTISONE 10; 3.5; 1 MG/ML; MG/ML; [USP'U]/ML
3 SUSPENSION/ DROPS AURICULAR (OTIC) 3 TIMES DAILY
Qty: 1 BOTTLE | Refills: 0 | Status: SHIPPED | OUTPATIENT
Start: 2018-02-23 | End: 2018-03-01

## 2018-02-23 NOTE — PROGRESS NOTES
Haleigh Tapia is a 28year old female.  Patient presents with:  Ear Problem: pt reports had left ear infection, completed antibiotics, little improvement with ax, feels discomfort and fluid inside of ear       HISTORY OF PRESENT ILLNESS    Patient  presents w Pregnancy-induced hypertension    • [place] teeth extracted      Past Surgical History:  No date: OTHER SURGICAL HISTORY      Comment: cyst removed right foot  No date: [facility] Neg/Pos Details   Constitutional N the procedure well. All questions were answered. Current Outpatient Prescriptions:   •  Neomycin-Polymyxin-HC 3.5-78814-2 Otic Suspension, Place 3 drops into the left ear 3 (three) times daily. , Disp: 1 Bottle, Rfl: 0  •  prenatal multivitamin plus DHA

## 2018-03-01 ENCOUNTER — OFFICE VISIT (OUTPATIENT)
Dept: OTOLARYNGOLOGY | Facility: CLINIC | Age: 28
End: 2018-03-01

## 2018-03-01 VITALS
TEMPERATURE: 98 F | WEIGHT: 205 LBS | SYSTOLIC BLOOD PRESSURE: 134 MMHG | DIASTOLIC BLOOD PRESSURE: 95 MMHG | BODY MASS INDEX: 38.71 KG/M2 | HEIGHT: 61 IN

## 2018-03-01 DIAGNOSIS — H60.312 CHRONIC DIFFUSE OTITIS EXTERNA OF LEFT EAR: Primary | ICD-10-CM

## 2018-03-01 PROCEDURE — 99213 OFFICE O/P EST LOW 20 MIN: CPT | Performed by: OTOLARYNGOLOGY

## 2018-03-01 PROCEDURE — 99212 OFFICE O/P EST SF 10 MIN: CPT | Performed by: OTOLARYNGOLOGY

## 2018-03-01 PROCEDURE — 69210 REMOVE IMPACTED EAR WAX UNI: CPT | Performed by: OTOLARYNGOLOGY

## 2018-03-01 RX ORDER — NEOMYCIN SULFATE, POLYMYXIN B SULFATE AND HYDROCORTISONE 10; 3.5; 1 MG/ML; MG/ML; [USP'U]/ML
3 SUSPENSION/ DROPS AURICULAR (OTIC) 3 TIMES DAILY
Qty: 1 BOTTLE | Refills: 1 | Status: SHIPPED | OUTPATIENT
Start: 2018-03-01 | End: 2018-03-11

## 2018-03-01 NOTE — PROGRESS NOTES
Andrew Watts is a 33year old female. Patient presents with: Follow - Up: regarding otitis externa of right ear, improvement in symptoms       HISTORY OF PRESENT ILLNESS  2/23/18  Patient  presents with ear pain in the left ears for6 weeks.  has not been ex • Pregnancy-induced hypertension    • [place] teeth extracted      Past Surgical History:  No date: OTHER SURGICAL HISTORY      Comment: cyst removed right foot  No date: [facility] Neg/Pos Details   Constitution tolerated the procedure well. All questions were answered. Current Outpatient Prescriptions:   •  Neomycin-Polymyxin-HC 3.5-32389-8 Otic Suspension, Place 3 drops into the left ear 3 (three) times daily. , Disp: 1 Bottle, Rfl: 1  •  prenatal multivitami

## 2018-03-17 ENCOUNTER — OFFICE VISIT (OUTPATIENT)
Dept: OTOLARYNGOLOGY | Facility: CLINIC | Age: 28
End: 2018-03-17

## 2018-03-17 VITALS
WEIGHT: 205 LBS | HEIGHT: 61 IN | TEMPERATURE: 98 F | BODY MASS INDEX: 38.71 KG/M2 | DIASTOLIC BLOOD PRESSURE: 90 MMHG | SYSTOLIC BLOOD PRESSURE: 130 MMHG

## 2018-03-17 DIAGNOSIS — H60.312 CHRONIC DIFFUSE OTITIS EXTERNA OF LEFT EAR: Primary | ICD-10-CM

## 2018-03-17 PROCEDURE — 99214 OFFICE O/P EST MOD 30 MIN: CPT | Performed by: OTOLARYNGOLOGY

## 2018-03-17 PROCEDURE — 99212 OFFICE O/P EST SF 10 MIN: CPT | Performed by: OTOLARYNGOLOGY

## 2018-03-17 NOTE — PROGRESS NOTES
Ramya Bangura is a 33year old female. Patient presents with:  Ear Problem: Left ear infection f/u      HISTORY OF PRESENT ILLNESS  Seen by Dr. Ciara Barrera on 2 occasions the last being March 1 for chronic otitis externa of the left ear.   She has been on eardrops History:  No date: OTHER SURGICAL HISTORY      Comment: cyst removed right foot  No date: [place] TEETH REMOVED      REVIEW OF SYSTEMS    System Neg/Pos Details   Constitutional Negative Fatigue, fever and weight loss. ENMT Negative Drooling.    Eyes Negat Normal. Oropharynx - Normal.   Nose/Mouth/Throat Normal Nares - Right: Normal Left: Normal. Septum -Normal  Turbinates - Right: Normal, Left: Normal.       Current Outpatient Prescriptions:   •  Fluticasone Propionate 50 MCG/ACT Nasal Suspension, 2 sprays

## 2019-06-02 PROBLEM — O09.299 H/O GESTATIONAL DIABETES IN PRIOR PREGNANCY, CURRENTLY PREGNANT: Status: ACTIVE | Noted: 2019-06-02

## 2019-06-02 PROBLEM — Z86.32 H/O GESTATIONAL DIABETES IN PRIOR PREGNANCY, CURRENTLY PREGNANT: Status: ACTIVE | Noted: 2019-06-02

## 2019-06-04 PROBLEM — Z86.32 HX GESTATIONAL DIABETES: Status: ACTIVE | Noted: 2019-06-04

## 2019-06-04 PROBLEM — Z34.81 PRENATAL CARE, SUBSEQUENT PREGNANCY, FIRST TRIMESTER: Status: ACTIVE | Noted: 2019-06-04

## 2019-06-04 PROCEDURE — 87624 HPV HI-RISK TYP POOLED RSLT: CPT | Performed by: OBSTETRICS & GYNECOLOGY

## 2019-06-04 PROCEDURE — 88175 CYTOPATH C/V AUTO FLUID REDO: CPT | Performed by: OBSTETRICS & GYNECOLOGY

## 2019-06-22 ENCOUNTER — LAB ENCOUNTER (OUTPATIENT)
Dept: LAB | Facility: HOSPITAL | Age: 29
End: 2019-06-22
Attending: OBSTETRICS & GYNECOLOGY
Payer: COMMERCIAL

## 2019-06-22 DIAGNOSIS — Z86.32 HX GESTATIONAL DIABETES: ICD-10-CM

## 2019-06-22 DIAGNOSIS — Z36.9 ENCOUNTER FOR ANTENATAL SCREENING OF MOTHER: ICD-10-CM

## 2019-06-22 PROCEDURE — 86762 RUBELLA ANTIBODY: CPT

## 2019-06-22 PROCEDURE — 83036 HEMOGLOBIN GLYCOSYLATED A1C: CPT

## 2019-06-22 PROCEDURE — 86780 TREPONEMA PALLIDUM: CPT

## 2019-06-22 PROCEDURE — 87340 HEPATITIS B SURFACE AG IA: CPT

## 2019-06-22 PROCEDURE — 87086 URINE CULTURE/COLONY COUNT: CPT

## 2019-06-22 PROCEDURE — 86900 BLOOD TYPING SEROLOGIC ABO: CPT

## 2019-06-22 PROCEDURE — 36415 COLL VENOUS BLD VENIPUNCTURE: CPT

## 2019-06-22 PROCEDURE — 85025 COMPLETE CBC W/AUTO DIFF WBC: CPT

## 2019-06-22 PROCEDURE — 87389 HIV-1 AG W/HIV-1&-2 AB AG IA: CPT

## 2019-06-22 PROCEDURE — 86850 RBC ANTIBODY SCREEN: CPT

## 2019-06-22 PROCEDURE — 86901 BLOOD TYPING SEROLOGIC RH(D): CPT

## 2019-07-08 ENCOUNTER — OFFICE VISIT (OUTPATIENT)
Dept: PERINATAL CARE | Facility: HOSPITAL | Age: 29
End: 2019-07-08
Attending: OBSTETRICS & GYNECOLOGY
Payer: COMMERCIAL

## 2019-07-08 VITALS
HEIGHT: 61 IN | BODY MASS INDEX: 36.06 KG/M2 | WEIGHT: 191 LBS | SYSTOLIC BLOOD PRESSURE: 144 MMHG | DIASTOLIC BLOOD PRESSURE: 82 MMHG | HEART RATE: 95 BPM

## 2019-07-08 DIAGNOSIS — Z86.32 H/O GESTATIONAL DIABETES IN PRIOR PREGNANCY, CURRENTLY PREGNANT: ICD-10-CM

## 2019-07-08 DIAGNOSIS — O09.299 H/O GESTATIONAL DIABETES IN PRIOR PREGNANCY, CURRENTLY PREGNANT: ICD-10-CM

## 2019-07-08 DIAGNOSIS — I10 CHRONIC HYPERTENSION: ICD-10-CM

## 2019-07-08 DIAGNOSIS — O99.211 OBESITY AFFECTING PREGNANCY IN FIRST TRIMESTER: ICD-10-CM

## 2019-07-08 PROCEDURE — 99243 OFF/OP CNSLTJ NEW/EST LOW 30: CPT | Performed by: OBSTETRICS & GYNECOLOGY

## 2019-07-08 PROCEDURE — 76813 OB US NUCHAL MEAS 1 GEST: CPT | Performed by: OBSTETRICS & GYNECOLOGY

## 2019-07-08 NOTE — PROGRESS NOTES
Indication: high bmi.   ____________________________________________________________________________  History: Age: 26 years.  : 4 Para: 1.  ____________________________________________________________________________  Dating:  LMP: 2019 EDC: and anti-hypertensive medication. She understands that a better diet, weight loss and routine exercise can help her blood pressure for the rest of her life.   We discussed the morbidity associated with hypertension including heart disease, strokes and kidn pregnancy has been consistently reported. In particular, maternal weight and BMI are independent risk factors for preeclampsia.              Studies have found that the increased risk of  birth in obese gravidas is primarily associated with obesity- based on maternal age, NT, EJ-A, free B-HCG will be faxed to your office directly from the lab. The final report will give the specific risk for Down syndrome and Trisomy 18. A complete anatomy survey is recommended at 18-20wks.   It is recommende

## 2019-07-27 ENCOUNTER — LAB ENCOUNTER (OUTPATIENT)
Dept: LAB | Facility: HOSPITAL | Age: 29
End: 2019-07-27
Attending: OBSTETRICS & GYNECOLOGY
Payer: COMMERCIAL

## 2019-07-27 DIAGNOSIS — O09.299 H/O GESTATIONAL DIABETES IN PRIOR PREGNANCY, CURRENTLY PREGNANT: ICD-10-CM

## 2019-07-27 DIAGNOSIS — Z86.32 H/O GESTATIONAL DIABETES IN PRIOR PREGNANCY, CURRENTLY PREGNANT: ICD-10-CM

## 2019-07-27 LAB — GLUCOSE 1H P GLC SERPL-MCNC: 154 MG/DL

## 2019-07-27 PROCEDURE — 36415 COLL VENOUS BLD VENIPUNCTURE: CPT

## 2019-07-27 PROCEDURE — 82950 GLUCOSE TEST: CPT

## 2019-08-03 ENCOUNTER — LABORATORY ENCOUNTER (OUTPATIENT)
Dept: LAB | Facility: HOSPITAL | Age: 29
End: 2019-08-03
Attending: OBSTETRICS & GYNECOLOGY
Payer: COMMERCIAL

## 2019-08-03 DIAGNOSIS — O99.810 ABNORMAL MATERNAL GLUCOSE TOLERANCE, ANTEPARTUM: ICD-10-CM

## 2019-08-03 LAB
1 HR GLUCOSE GESTATIONAL: 133 MG/DL
GLUCOSE 1H P GLC SERPL-MCNC: 127 MG/DL
GLUCOSE 3H P GLC SERPL-MCNC: 95 MG/DL
GLUCOSE BLD-MCNC: 84 MG/DL (ref 70–99)
GLUCOSE P FAST SERPL-MCNC: 81 MG/DL

## 2019-08-03 PROCEDURE — 82952 GTT-ADDED SAMPLES: CPT

## 2019-08-03 PROCEDURE — 82951 GLUCOSE TOLERANCE TEST (GTT): CPT

## 2019-08-03 PROCEDURE — 82962 GLUCOSE BLOOD TEST: CPT

## 2019-08-03 PROCEDURE — 36415 COLL VENOUS BLD VENIPUNCTURE: CPT

## 2019-08-26 ENCOUNTER — OFFICE VISIT (OUTPATIENT)
Dept: PERINATAL CARE | Facility: HOSPITAL | Age: 29
End: 2019-08-26
Attending: OBSTETRICS & GYNECOLOGY
Payer: COMMERCIAL

## 2019-08-26 VITALS
BODY MASS INDEX: 36.44 KG/M2 | HEIGHT: 61 IN | HEART RATE: 114 BPM | SYSTOLIC BLOOD PRESSURE: 139 MMHG | WEIGHT: 193 LBS | DIASTOLIC BLOOD PRESSURE: 92 MMHG

## 2019-08-26 DIAGNOSIS — I10 CHRONIC HYPERTENSION: ICD-10-CM

## 2019-08-26 DIAGNOSIS — Z86.32 H/O GESTATIONAL DIABETES IN PRIOR PREGNANCY, CURRENTLY PREGNANT: ICD-10-CM

## 2019-08-26 DIAGNOSIS — O09.299 H/O GESTATIONAL DIABETES IN PRIOR PREGNANCY, CURRENTLY PREGNANT: ICD-10-CM

## 2019-08-26 PROCEDURE — 99214 OFFICE O/P EST MOD 30 MIN: CPT | Performed by: OBSTETRICS & GYNECOLOGY

## 2019-08-26 PROCEDURE — 76811 OB US DETAILED SNGL FETUS: CPT | Performed by: OBSTETRICS & GYNECOLOGY

## 2019-08-26 NOTE — PROGRESS NOTES
Indication: chtn, hx of gdm, increased bmi.   ____________________________________________________________________________  History: Age: 26 years.  : 4 Para: 1.  ____________________________________________________________________________  Dating: fetus.    ____________________________________________________________________________  Maternal Structures:  Cervix: normal. Cervical length: 43 mm.  ____________________________________________________________________________  Comments:      Petra Louis consistent with the established EDC. No ultrasound evidence of structural abnormalities are seen today. The nasal bone is present. No ultrasound evidence of markers for aneuploidy are seen.  She understands that ultrasound exam cannot exclude genetic abn

## 2019-09-30 ENCOUNTER — LAB ENCOUNTER (OUTPATIENT)
Dept: LAB | Facility: HOSPITAL | Age: 29
End: 2019-09-30
Attending: OBSTETRICS & GYNECOLOGY
Payer: COMMERCIAL

## 2019-09-30 DIAGNOSIS — Z36.9 ENCOUNTER FOR ANTENATAL SCREENING OF MOTHER: ICD-10-CM

## 2019-09-30 DIAGNOSIS — I10 CHRONIC HYPERTENSION: ICD-10-CM

## 2019-09-30 PROCEDURE — 85025 COMPLETE CBC W/AUTO DIFF WBC: CPT

## 2019-09-30 PROCEDURE — 86780 TREPONEMA PALLIDUM: CPT

## 2019-09-30 PROCEDURE — 87389 HIV-1 AG W/HIV-1&-2 AB AG IA: CPT

## 2019-09-30 PROCEDURE — 80053 COMPREHEN METABOLIC PANEL: CPT

## 2019-09-30 PROCEDURE — 84550 ASSAY OF BLOOD/URIC ACID: CPT

## 2019-09-30 PROCEDURE — 36415 COLL VENOUS BLD VENIPUNCTURE: CPT

## 2019-09-30 PROCEDURE — 84156 ASSAY OF PROTEIN URINE: CPT

## 2019-10-19 ENCOUNTER — LABORATORY ENCOUNTER (OUTPATIENT)
Dept: LAB | Facility: HOSPITAL | Age: 29
End: 2019-10-19
Attending: OBSTETRICS & GYNECOLOGY
Payer: COMMERCIAL

## 2019-10-19 PROCEDURE — 82962 GLUCOSE BLOOD TEST: CPT

## 2019-10-19 PROCEDURE — 82952 GTT-ADDED SAMPLES: CPT

## 2019-10-19 PROCEDURE — 36415 COLL VENOUS BLD VENIPUNCTURE: CPT

## 2019-10-19 PROCEDURE — 82951 GLUCOSE TOLERANCE TEST (GTT): CPT

## 2019-10-21 PROBLEM — O24.419 GESTATIONAL DIABETES: Status: ACTIVE | Noted: 2019-10-21

## 2019-11-04 ENCOUNTER — OFFICE VISIT (OUTPATIENT)
Dept: PERINATAL CARE | Facility: HOSPITAL | Age: 29
End: 2019-11-04
Attending: OBSTETRICS & GYNECOLOGY
Payer: COMMERCIAL

## 2019-11-04 VITALS
SYSTOLIC BLOOD PRESSURE: 137 MMHG | WEIGHT: 212 LBS | DIASTOLIC BLOOD PRESSURE: 93 MMHG | HEART RATE: 118 BPM | RESPIRATION RATE: 20 BRPM | BODY MASS INDEX: 40.02 KG/M2 | HEIGHT: 61 IN

## 2019-11-04 DIAGNOSIS — O24.410 DIET CONTROLLED GESTATIONAL DIABETES MELLITUS (GDM) IN THIRD TRIMESTER: ICD-10-CM

## 2019-11-04 DIAGNOSIS — I10 CHRONIC HYPERTENSION: ICD-10-CM

## 2019-11-04 PROCEDURE — 76816 OB US FOLLOW-UP PER FETUS: CPT | Performed by: OBSTETRICS & GYNECOLOGY

## 2019-11-04 PROCEDURE — 99214 OFFICE O/P EST MOD 30 MIN: CPT | Performed by: OBSTETRICS & GYNECOLOGY

## 2019-11-04 NOTE — PROGRESS NOTES
Indication: gdm, chtn, inc bmi.   ____________________________________________________________________________  History: Age: 26 years.  : 4 Para: 1.  ____________________________________________________________________________  Dating:  LMP: 04/08 hashimoto's               We discussed the following:         She was informed of the potential implications and risks associated with gestational diabetes (GDM) to her and her unborn child, especially when poorly controlled.  We discussed about the increas findings: The fetal measurements are consistent with the established EDC. No ultrasound evidence of structural abnormalities are seen today. She understands that ultrasound exam cannot exclude genetic abnormalities.  The limitations of ultrasound were

## 2019-11-04 NOTE — PROGRESS NOTES
Pt seen in MFM at 30 0/7 weeks gestation, ambulatory and accompanied by family. + FM. Pt states she was diagnosed gestational diabetic and has appointment with diabetes education office tomorrow and will get meter.  Pt also reports elevated pressures, OB wa

## 2019-11-22 ENCOUNTER — HOSPITAL ENCOUNTER (INPATIENT)
Facility: HOSPITAL | Age: 29
LOS: 7 days | Discharge: HOME | End: 2019-11-29
Attending: OBSTETRICS & GYNECOLOGY | Admitting: OBSTETRICS & GYNECOLOGY
Payer: COMMERCIAL

## 2019-11-22 PROBLEM — Z34.90 PREGNANCY: Status: ACTIVE | Noted: 2019-11-22

## 2019-11-22 PROBLEM — O13.9 PIH (PREGNANCY INDUCED HYPERTENSION): Status: ACTIVE | Noted: 2019-11-22

## 2019-11-22 PROCEDURE — 86900 BLOOD TYPING SEROLOGIC ABO: CPT | Performed by: OBSTETRICS & GYNECOLOGY

## 2019-11-22 PROCEDURE — 86780 TREPONEMA PALLIDUM: CPT | Performed by: OBSTETRICS & GYNECOLOGY

## 2019-11-22 PROCEDURE — 84550 ASSAY OF BLOOD/URIC ACID: CPT | Performed by: OBSTETRICS & GYNECOLOGY

## 2019-11-22 PROCEDURE — 86901 BLOOD TYPING SEROLOGIC RH(D): CPT | Performed by: OBSTETRICS & GYNECOLOGY

## 2019-11-22 PROCEDURE — 80053 COMPREHEN METABOLIC PANEL: CPT | Performed by: OBSTETRICS & GYNECOLOGY

## 2019-11-22 PROCEDURE — 87081 CULTURE SCREEN ONLY: CPT | Performed by: OBSTETRICS & GYNECOLOGY

## 2019-11-22 PROCEDURE — 87147 CULTURE TYPE IMMUNOLOGIC: CPT | Performed by: OBSTETRICS & GYNECOLOGY

## 2019-11-22 PROCEDURE — 85025 COMPLETE CBC W/AUTO DIFF WBC: CPT | Performed by: OBSTETRICS & GYNECOLOGY

## 2019-11-22 PROCEDURE — 86701 HIV-1ANTIBODY: CPT | Performed by: OBSTETRICS & GYNECOLOGY

## 2019-11-22 PROCEDURE — 84156 ASSAY OF PROTEIN URINE: CPT | Performed by: OBSTETRICS & GYNECOLOGY

## 2019-11-22 PROCEDURE — 99214 OFFICE O/P EST MOD 30 MIN: CPT

## 2019-11-22 PROCEDURE — 82570 ASSAY OF URINE CREATININE: CPT | Performed by: OBSTETRICS & GYNECOLOGY

## 2019-11-22 PROCEDURE — 86850 RBC ANTIBODY SCREEN: CPT | Performed by: OBSTETRICS & GYNECOLOGY

## 2019-11-22 RX ORDER — ZOLPIDEM TARTRATE 5 MG/1
5 TABLET ORAL NIGHTLY PRN
Status: DISCONTINUED | OUTPATIENT
Start: 2019-11-22 | End: 2019-11-27

## 2019-11-22 RX ORDER — CALCIUM CARBONATE 200(500)MG
1000 TABLET,CHEWABLE ORAL
Status: DISCONTINUED | OUTPATIENT
Start: 2019-11-22 | End: 2019-11-29

## 2019-11-22 RX ORDER — HYDRALAZINE HYDROCHLORIDE 20 MG/ML
10 INJECTION INTRAMUSCULAR; INTRAVENOUS ONCE AS NEEDED
Status: COMPLETED | OUTPATIENT
Start: 2019-11-22 | End: 2019-11-22

## 2019-11-22 RX ORDER — HYDRALAZINE HYDROCHLORIDE 20 MG/ML
INJECTION INTRAMUSCULAR; INTRAVENOUS
Status: DISPENSED
Start: 2019-11-22 | End: 2019-11-23

## 2019-11-22 RX ORDER — LABETALOL 200 MG/1
200 TABLET, FILM COATED ORAL EVERY 12 HOURS SCHEDULED
Status: DISCONTINUED | OUTPATIENT
Start: 2019-11-22 | End: 2019-11-23

## 2019-11-22 RX ORDER — SODIUM CHLORIDE, SODIUM LACTATE, POTASSIUM CHLORIDE, CALCIUM CHLORIDE 600; 310; 30; 20 MG/100ML; MG/100ML; MG/100ML; MG/100ML
INJECTION, SOLUTION INTRAVENOUS CONTINUOUS
Status: DISCONTINUED | OUTPATIENT
Start: 2019-11-22 | End: 2019-11-26

## 2019-11-22 RX ORDER — LABETALOL HYDROCHLORIDE 5 MG/ML
80 INJECTION, SOLUTION INTRAVENOUS ONCE AS NEEDED
Status: COMPLETED | OUTPATIENT
Start: 2019-11-22 | End: 2019-11-22

## 2019-11-22 RX ORDER — BETAMETHASONE SODIUM PHOSPHATE AND BETAMETHASONE ACETATE 3; 3 MG/ML; MG/ML
12 INJECTION, SUSPENSION INTRA-ARTICULAR; INTRALESIONAL; INTRAMUSCULAR; SOFT TISSUE EVERY 24 HOURS
Status: COMPLETED | OUTPATIENT
Start: 2019-11-22 | End: 2019-11-23

## 2019-11-22 RX ORDER — DOCUSATE SODIUM 100 MG/1
100 CAPSULE, LIQUID FILLED ORAL 2 TIMES DAILY
Status: DISCONTINUED | OUTPATIENT
Start: 2019-11-22 | End: 2019-11-27

## 2019-11-22 RX ORDER — LABETALOL HYDROCHLORIDE 5 MG/ML
40 INJECTION, SOLUTION INTRAVENOUS ONCE AS NEEDED
Status: ACTIVE | OUTPATIENT
Start: 2019-11-22 | End: 2019-11-22

## 2019-11-22 RX ORDER — CALCIUM GLUCONATE 94 MG/ML
1 INJECTION, SOLUTION INTRAVENOUS ONCE AS NEEDED
Status: DISCONTINUED | OUTPATIENT
Start: 2019-11-22 | End: 2019-11-23

## 2019-11-22 RX ORDER — SODIUM CHLORIDE, SODIUM LACTATE, POTASSIUM CHLORIDE, CALCIUM CHLORIDE 600; 310; 30; 20 MG/100ML; MG/100ML; MG/100ML; MG/100ML
INJECTION, SOLUTION INTRAVENOUS CONTINUOUS
Status: DISCONTINUED | OUTPATIENT
Start: 2019-11-22 | End: 2019-11-29

## 2019-11-22 RX ORDER — NIFEDIPINE 10 MG/1
10 CAPSULE ORAL ONCE AS NEEDED
Status: COMPLETED | OUTPATIENT
Start: 2019-11-22 | End: 2019-11-26

## 2019-11-22 RX ORDER — LABETALOL HYDROCHLORIDE 5 MG/ML
40 INJECTION, SOLUTION INTRAVENOUS ONCE AS NEEDED
Status: COMPLETED | OUTPATIENT
Start: 2019-11-22 | End: 2019-11-22

## 2019-11-22 RX ORDER — LABETALOL 200 MG/1
200 TABLET, FILM COATED ORAL EVERY 12 HOURS SCHEDULED
Status: DISCONTINUED | OUTPATIENT
Start: 2019-11-22 | End: 2019-11-22

## 2019-11-22 RX ORDER — NIFEDIPINE 20 MG/1
20 CAPSULE ORAL ONCE AS NEEDED
Status: DISCONTINUED | OUTPATIENT
Start: 2019-11-22 | End: 2019-11-27

## 2019-11-22 RX ORDER — HYDRALAZINE HYDROCHLORIDE 20 MG/ML
10 INJECTION INTRAMUSCULAR; INTRAVENOUS ONCE
Status: COMPLETED | OUTPATIENT
Start: 2019-11-22 | End: 2019-11-22

## 2019-11-22 RX ORDER — ACETAMINOPHEN 500 MG
1000 TABLET ORAL EVERY 6 HOURS PRN
Status: DISCONTINUED | OUTPATIENT
Start: 2019-11-22 | End: 2019-11-27

## 2019-11-22 RX ORDER — NIFEDIPINE 20 MG/1
20 CAPSULE ORAL ONCE AS NEEDED
Status: ACTIVE | OUTPATIENT
Start: 2019-11-22 | End: 2019-11-22

## 2019-11-22 RX ORDER — LABETALOL HYDROCHLORIDE 5 MG/ML
20 INJECTION, SOLUTION INTRAVENOUS ONCE AS NEEDED
Status: COMPLETED | OUTPATIENT
Start: 2019-11-22 | End: 2019-11-22

## 2019-11-22 RX ORDER — LABETALOL HYDROCHLORIDE 5 MG/ML
INJECTION, SOLUTION INTRAVENOUS
Status: COMPLETED
Start: 2019-11-22 | End: 2019-11-22

## 2019-11-22 RX ORDER — ACETAMINOPHEN 500 MG
500 TABLET ORAL EVERY 6 HOURS PRN
Status: DISCONTINUED | OUTPATIENT
Start: 2019-11-22 | End: 2019-11-27

## 2019-11-22 RX ORDER — BETAMETHASONE SODIUM PHOSPHATE AND BETAMETHASONE ACETATE 3; 3 MG/ML; MG/ML
INJECTION, SUSPENSION INTRA-ARTICULAR; INTRALESIONAL; INTRAMUSCULAR; SOFT TISSUE
Status: DISPENSED
Start: 2019-11-22 | End: 2019-11-23

## 2019-11-22 NOTE — PROGRESS NOTES
Antepartum Progress Note    Pt without complaints. Denies HAs, visual changes, abd pain.   Pulse:  [77-96] 96  BP: (129-198)/() 183/114   Patient Vitals for the past 24 hrs:   BP Pulse Height Weight   11/22/19 1720 (!) 183/114 96 — —   11/22/19 1711 (

## 2019-11-22 NOTE — H&P
[facility]  History & Physical    SUBJECTIVE:    Claire Doyle is a 26year old  at 32w4d who presents with elevated BP from office.  history sig for cHTN, gestational DM.     Obstetric History:    OB History    Para Term Pret Blood (ONETOUCH VERIO) In Vitro Strip, Check BG fasting and 2 hours after meals, 4 times daily, Disp: 150 strip, Rfl: 3  Blood Glucose Calibration (ONETOUCH VERIO) In Vitro Solution, Check 1 strip from each new bottle of test strips with control solution; at 48h from betamethasone

## 2019-11-22 NOTE — PROGRESS NOTES
Pt is a 26year old female admitted to TRG2/TRG2-A. Patient presents with:  R/o Pih     Pt is  32w4d intra-uterine pregnancy. History obtained, consents signed. Oriented to room, staff, and plan of care.

## 2019-11-23 PROCEDURE — 84156 ASSAY OF PROTEIN URINE: CPT | Performed by: OBSTETRICS & GYNECOLOGY

## 2019-11-23 PROCEDURE — 82962 GLUCOSE BLOOD TEST: CPT

## 2019-11-23 PROCEDURE — 85025 COMPLETE CBC W/AUTO DIFF WBC: CPT | Performed by: OBSTETRICS & GYNECOLOGY

## 2019-11-23 PROCEDURE — 80053 COMPREHEN METABOLIC PANEL: CPT | Performed by: OBSTETRICS & GYNECOLOGY

## 2019-11-23 PROCEDURE — 83735 ASSAY OF MAGNESIUM: CPT | Performed by: OBSTETRICS & GYNECOLOGY

## 2019-11-23 PROCEDURE — 84550 ASSAY OF BLOOD/URIC ACID: CPT | Performed by: OBSTETRICS & GYNECOLOGY

## 2019-11-23 RX ORDER — LABETALOL 200 MG/1
200 TABLET, FILM COATED ORAL EVERY 12 HOURS SCHEDULED
Status: DISCONTINUED | OUTPATIENT
Start: 2019-11-23 | End: 2019-11-29

## 2019-11-23 RX ORDER — CHOLECALCIFEROL (VITAMIN D3) 25 MCG
1 TABLET,CHEWABLE ORAL DAILY
Status: DISCONTINUED | OUTPATIENT
Start: 2019-11-23 | End: 2019-11-29

## 2019-11-23 RX ORDER — FLUTICASONE PROPIONATE 50 MCG
1 SPRAY, SUSPENSION (ML) NASAL 2 TIMES DAILY
Status: DISCONTINUED | OUTPATIENT
Start: 2019-11-23 | End: 2019-11-29

## 2019-11-23 RX ORDER — CHOLECALCIFEROL (VITAMIN D3) 25 MCG
1 TABLET,CHEWABLE ORAL DAILY
Status: DISCONTINUED | OUTPATIENT
Start: 2019-11-23 | End: 2019-11-23

## 2019-11-23 NOTE — CONSULTS
[facility]  Maternal-Fetal Medicine Inpatient Consultation    Date of Admission:  11/22/2019  Date of Consult:  11/23/2019    Reason for Consult:   Chronic hypertension with superimposed preeclampsia    History of Present Illness:  Cristopher Rascon Greer Severin: Not recorded     Brien Munoz: Not recorded    Living: Not recorded    Name of Baby 3: Arpan Conn    Date: 03/21/17         GA: 37w2d            Delivery: Normal spontaneous vaginal delivery    Apgar1: 8                Apgar5: 9               Living: Antonella Casas Daily PRN  •  [COMPLETED] MAGNESIUM SULFATE BOLUS FROM BAG 6 g infusion, 6 g, Intravenous, Once **FOLLOWED BY** magnesium sulfate 40mg/ml infusion, 2 g/hr, Intravenous, Continuous  •  calcium gluconate injection 1 g, 1 g, Intravenous, Once PRN  •  NIFEdipi elevated blood pressures are the only criteria that meets severe features of preeclampsia, we have the option of expectant management.   What inspected management would entail would be hospitalization with frequent fetal monitoring, daily or every other day versus expectant management. She is also going to take into consideration the information she learns from the neonatology consult. IMPRESSION:   1.  IUP at 32w5d  2. Chronic HTN with superimposed preeclampsia  3.   Severe range blood pressures are not

## 2019-11-23 NOTE — PLAN OF CARE
Problem: BIRTH - VAGINAL/ SECTION  Goal: Fetal and maternal status remain reassuring during the birth process  Description  INTERVENTIONS:  - Monitor vital signs  - Monitor fetal heart rate  - Monitor uterine activity  - Monitor labor progression ordered  - Bedrest  Outcome: Progressing  Goal: Anxiety is at manageable level  Description  INTERVENTIONS:  - [place] patient to unit/surroundings  - Establish a trusting relationship with patient  - Discuss possible complications or alterations in birth p

## 2019-11-23 NOTE — PROGRESS NOTES
OB/GYN: Antepartum Progress Note     SUBJECTIVE:  Patient without c/o, no HA or visual changes  Interval History:   Fetal Movement: good  Vaginal Bleeding: none  Contractions: none  Leakage of Fluid: none    OBJECTIVE:  Vital signs in last 24 hours:  Tem Patient Active Problem List:     Chronic hypertension     H/O gestational diabetes in prior pregnancy, currently pregnant     Hx gestational diabetes     Prenatal care, subsequent pregnancy, first trimester     Gestational diabetes     Pregnancy     PIH

## 2019-11-24 PROCEDURE — 80053 COMPREHEN METABOLIC PANEL: CPT | Performed by: OBSTETRICS & GYNECOLOGY

## 2019-11-24 PROCEDURE — 82962 GLUCOSE BLOOD TEST: CPT

## 2019-11-24 PROCEDURE — 85007 BL SMEAR W/DIFF WBC COUNT: CPT | Performed by: OBSTETRICS & GYNECOLOGY

## 2019-11-24 PROCEDURE — 84550 ASSAY OF BLOOD/URIC ACID: CPT | Performed by: OBSTETRICS & GYNECOLOGY

## 2019-11-24 PROCEDURE — 85027 COMPLETE CBC AUTOMATED: CPT | Performed by: OBSTETRICS & GYNECOLOGY

## 2019-11-24 PROCEDURE — 85025 COMPLETE CBC W/AUTO DIFF WBC: CPT | Performed by: OBSTETRICS & GYNECOLOGY

## 2019-11-24 NOTE — PROGRESS NOTES
OB/GYN: Antepartum Progress Note     SUBJECTIVE:  Interval History: no new c/o, no HA , good FM  Fetal Movement: good  Vaginal Bleeding: none  Contractions: none  Leakage of Fluid: none    OBJECTIVE:  Vital signs in last 24 hours:  Temp:  [97.7 °F (36.5 gestational diabetes     Prenatal care, subsequent pregnancy, first trimester     Gestational diabetes     Pregnancy     PIH (pregnancy induced hypertension)      Anabell Carrillo  11/24/2019  9:56 AM

## 2019-11-24 NOTE — PLAN OF CARE
Problem: BIRTH - VAGINAL/ SECTION  Goal: Fetal and maternal status remain reassuring during the birth process  Description  INTERVENTIONS:  - Monitor vital signs  - Monitor fetal heart rate  - Monitor uterine activity  - Monitor labor progression ordered  - Bedrest  Outcome: Progressing  Goal: Anxiety is at manageable level  Description  INTERVENTIONS:  - [place] patient to unit/surroundings  - Establish a trusting relationship with patient  - Discuss possible complications or alterations in birth p

## 2019-11-25 ENCOUNTER — ANESTHESIA EVENT (OUTPATIENT)
Dept: OBGYN UNIT | Facility: HOSPITAL | Age: 29
End: 2019-11-25

## 2019-11-25 ENCOUNTER — ANESTHESIA (OUTPATIENT)
Dept: OBGYN UNIT | Facility: HOSPITAL | Age: 29
End: 2019-11-25

## 2019-11-25 PROCEDURE — 86900 BLOOD TYPING SEROLOGIC ABO: CPT | Performed by: OBSTETRICS & GYNECOLOGY

## 2019-11-25 PROCEDURE — 85025 COMPLETE CBC W/AUTO DIFF WBC: CPT | Performed by: OBSTETRICS & GYNECOLOGY

## 2019-11-25 PROCEDURE — 84550 ASSAY OF BLOOD/URIC ACID: CPT | Performed by: OBSTETRICS & GYNECOLOGY

## 2019-11-25 PROCEDURE — 80053 COMPREHEN METABOLIC PANEL: CPT | Performed by: OBSTETRICS & GYNECOLOGY

## 2019-11-25 PROCEDURE — 82962 GLUCOSE BLOOD TEST: CPT

## 2019-11-25 PROCEDURE — 85027 COMPLETE CBC AUTOMATED: CPT | Performed by: OBSTETRICS & GYNECOLOGY

## 2019-11-25 PROCEDURE — 76820 UMBILICAL ARTERY ECHO: CPT | Performed by: OBSTETRICS & GYNECOLOGY

## 2019-11-25 PROCEDURE — 76819 FETAL BIOPHYS PROFIL W/O NST: CPT | Performed by: OBSTETRICS & GYNECOLOGY

## 2019-11-25 PROCEDURE — 86901 BLOOD TYPING SEROLOGIC RH(D): CPT | Performed by: OBSTETRICS & GYNECOLOGY

## 2019-11-25 PROCEDURE — 85007 BL SMEAR W/DIFF WBC COUNT: CPT | Performed by: OBSTETRICS & GYNECOLOGY

## 2019-11-25 PROCEDURE — 86850 RBC ANTIBODY SCREEN: CPT | Performed by: OBSTETRICS & GYNECOLOGY

## 2019-11-25 NOTE — PROGRESS NOTES
Pt called with questions concerning POC, EFM monitoring. Pt  at bedside. Pt states + FM, denies change in fetal activity. Denies headache, visual changes or epigastric pain.

## 2019-11-25 NOTE — CONSULTS
I met with the parents to discuss the care of a 32 week premature infant. In brief mother is a 25 yo  female who presents with elevated BP in the setting of cHTN and gDM. She is s/p BMZ - and mag.   OB plan at this time to observe pressure

## 2019-11-25 NOTE — IMAGING NOTE
Indication: HTN, preeclampsia.   ____________________________________________________________________________  History: Age: 26 years.  : 4 Para: 1.  ____________________________________________________________________________  Dating:  LMP: 04

## 2019-11-25 NOTE — PROGRESS NOTES
Report received from [employer], [facility]. Pt denies any complaints. POC discussed with pt, pt denies questions. Call light within reach.

## 2019-11-25 NOTE — PROGRESS NOTES
Dr. Samuel Carrero notified of NST at this time;  This RN made MD aware of minimal variability during this NST and MD made aware of accelerations on NST from this AM. MD understood this RN's concerns and stated to repeat an NST in AM. No other orders given at th

## 2019-11-25 NOTE — CONSULTS
[facility]    Maternal Fetal Medicine Progress Note    Rustam Perez Patient Status:  Inpatient    1990 MRN [id number]   Location [address] [facility] Attending Shelia Christopher MD   Hosp Day # 3 PCP Valerie Leon MD     SUBJECTIVE:  Nadine Uribe delivery; otherwise delivery planed for 34 weeks  · Daily labs     Questions/concerns were discussed with patient and/or family at bedside. Total Time spent with patient and coordinating care:  35 minutes    Kimmy [address].  Joselito Saravia M.D.  11/25/2019  9:39 AM

## 2019-11-25 NOTE — PLAN OF CARE
Problem: BIRTH - VAGINAL/ SECTION  Goal: Fetal and maternal status remain reassuring during the birth process  Description  INTERVENTIONS:  - Monitor vital signs  - Monitor fetal heart rate  - Monitor uterine activity  - Monitor labor progression ordered  - Bedrest  Outcome: Progressing  Goal: Anxiety is at manageable level  Description  INTERVENTIONS:  - [place] patient to unit/surroundings  - Establish a trusting relationship with patient  - Discuss possible complications or alterations in birth p

## 2019-11-25 NOTE — PROGRESS NOTES
Pt complaints: Active FM. Denies abd pain, vaginal bleeding, HA, dizziness, blurry vision.      /90 (BP Location: Right arm)   Pulse 85   Temp 98.3 °F (36.8 °C) (Oral)   Resp 20   Ht 5' 1\" (1.549 m)   Wt 210 lb (95.3 kg)   LMP 04/08/2019   SpO2 95%

## 2019-11-26 ENCOUNTER — ANESTHESIA EVENT (OUTPATIENT)
Dept: OBGYN UNIT | Facility: HOSPITAL | Age: 29
End: 2019-11-26
Payer: COMMERCIAL

## 2019-11-26 ENCOUNTER — ANESTHESIA (OUTPATIENT)
Dept: OBGYN UNIT | Facility: HOSPITAL | Age: 29
End: 2019-11-26
Payer: COMMERCIAL

## 2019-11-26 PROBLEM — O09.299 H/O GESTATIONAL DIABETES IN PRIOR PREGNANCY, CURRENTLY PREGNANT: Status: RESOLVED | Noted: 2019-06-02 | Resolved: 2019-11-26

## 2019-11-26 PROBLEM — Z86.32 H/O GESTATIONAL DIABETES IN PRIOR PREGNANCY, CURRENTLY PREGNANT: Status: RESOLVED | Noted: 2019-06-02 | Resolved: 2019-11-26

## 2019-11-26 PROCEDURE — 85027 COMPLETE CBC AUTOMATED: CPT | Performed by: OBSTETRICS & GYNECOLOGY

## 2019-11-26 PROCEDURE — 10907ZC DRAINAGE OF AMNIOTIC FLUID, THERAPEUTIC FROM PRODUCTS OF CONCEPTION, VIA NATURAL OR ARTIFICIAL OPENING: ICD-10-PCS | Performed by: OBSTETRICS & GYNECOLOGY

## 2019-11-26 PROCEDURE — 82962 GLUCOSE BLOOD TEST: CPT

## 2019-11-26 PROCEDURE — 3E033VJ INTRODUCTION OF OTHER HORMONE INTO PERIPHERAL VEIN, PERCUTANEOUS APPROACH: ICD-10-PCS | Performed by: OBSTETRICS & GYNECOLOGY

## 2019-11-26 PROCEDURE — 85007 BL SMEAR W/DIFF WBC COUNT: CPT | Performed by: OBSTETRICS & GYNECOLOGY

## 2019-11-26 PROCEDURE — 85025 COMPLETE CBC W/AUTO DIFF WBC: CPT | Performed by: OBSTETRICS & GYNECOLOGY

## 2019-11-26 PROCEDURE — 84550 ASSAY OF BLOOD/URIC ACID: CPT | Performed by: OBSTETRICS & GYNECOLOGY

## 2019-11-26 PROCEDURE — 80053 COMPREHEN METABOLIC PANEL: CPT | Performed by: OBSTETRICS & GYNECOLOGY

## 2019-11-26 RX ORDER — LABETALOL HYDROCHLORIDE 5 MG/ML
40 INJECTION, SOLUTION INTRAVENOUS ONCE AS NEEDED
Status: DISCONTINUED | OUTPATIENT
Start: 2019-11-26 | End: 2019-11-29

## 2019-11-26 RX ORDER — ONDANSETRON 2 MG/ML
INJECTION INTRAMUSCULAR; INTRAVENOUS
Status: COMPLETED
Start: 2019-11-26 | End: 2019-11-26

## 2019-11-26 RX ORDER — HYDRALAZINE HYDROCHLORIDE 20 MG/ML
10 INJECTION INTRAMUSCULAR; INTRAVENOUS ONCE AS NEEDED
Status: DISCONTINUED | OUTPATIENT
Start: 2019-11-26 | End: 2019-11-29

## 2019-11-26 RX ORDER — EPHEDRINE SULFATE/0.9% NACL/PF 25 MG/5 ML
5 SYRINGE (ML) INTRAVENOUS AS NEEDED
Status: DISCONTINUED | OUTPATIENT
Start: 2019-11-26 | End: 2019-11-27

## 2019-11-26 RX ORDER — LIDOCAINE HYDROCHLORIDE AND EPINEPHRINE 15; 5 MG/ML; UG/ML
INJECTION, SOLUTION EPIDURAL AS NEEDED
Status: DISCONTINUED | OUTPATIENT
Start: 2019-11-26 | End: 2019-11-27 | Stop reason: SURG

## 2019-11-26 RX ORDER — LABETALOL HYDROCHLORIDE 5 MG/ML
80 INJECTION, SOLUTION INTRAVENOUS ONCE AS NEEDED
Status: DISCONTINUED | OUTPATIENT
Start: 2019-11-26 | End: 2019-11-29

## 2019-11-26 RX ORDER — LABETALOL HYDROCHLORIDE 5 MG/ML
20 INJECTION, SOLUTION INTRAVENOUS ONCE AS NEEDED
Status: COMPLETED | OUTPATIENT
Start: 2019-11-26 | End: 2019-11-26

## 2019-11-26 RX ORDER — NALBUPHINE HCL 10 MG/ML
2.5 AMPUL (ML) INJECTION
Status: DISCONTINUED | OUTPATIENT
Start: 2019-11-26 | End: 2019-11-29

## 2019-11-26 RX ORDER — LABETALOL HYDROCHLORIDE 5 MG/ML
INJECTION, SOLUTION INTRAVENOUS
Status: DISPENSED
Start: 2019-11-26 | End: 2019-11-26

## 2019-11-26 RX ORDER — CALCIUM GLUCONATE 94 MG/ML
1 INJECTION, SOLUTION INTRAVENOUS ONCE AS NEEDED
Status: DISCONTINUED | OUTPATIENT
Start: 2019-11-26 | End: 2019-11-29

## 2019-11-26 RX ADMIN — LIDOCAINE HYDROCHLORIDE AND EPINEPHRINE 5 ML: 15; 5 INJECTION, SOLUTION EPIDURAL at 19:48:00

## 2019-11-26 NOTE — PROGRESS NOTES
30 min ago pt examined due to pressure and mild discomfort  cx 3/73/-5, cephalic  BP in acceptable range  DTR 1+

## 2019-11-26 NOTE — PROGRESS NOTES
Report received from Derrek Ndiaye. Patient received in stable condition. POC for shift discussed with patient and . No questions for RN at this time. Call light within reach.

## 2019-11-26 NOTE — PLAN OF CARE
Problem: BIRTH - VAGINAL/ SECTION  Goal: Fetal and maternal status remain reassuring during the birth process  Description  INTERVENTIONS:  - Monitor vital signs  - Monitor fetal heart rate  - Monitor uterine activity  - Monitor labor progression ordered  - Bedrest  Outcome: Progressing  Goal: Anxiety is at manageable level  Description  INTERVENTIONS:  - [place] patient to unit/surroundings  - Establish a trusting relationship with patient  - Discuss possible complications or alterations in birth p

## 2019-11-26 NOTE — PLAN OF CARE
Problem: BIRTH - VAGINAL/ SECTION  Goal: Fetal and maternal status remain reassuring during the birth process  Description  INTERVENTIONS:  - Monitor vital signs  - Monitor fetal heart rate  - Monitor uterine activity  - Monitor labor progression ordered  - Bedrest  Outcome: Progressing  Goal: Anxiety is at manageable level  Description  INTERVENTIONS:  - [place] patient to unit/surroundings  - Establish a trusting relationship with patient  - Discuss possible complications or alterations in birth p

## 2019-11-26 NOTE — PROGRESS NOTES
RN to bedside to give patient am dose of labetalol 200mg BID. BP done at this time and noted to meet hypertensive criteria. BP cyceld to take again in 15 mins. Again BP bet hypertensive criteria.  BP cuff was moved to verfiy BP validity and was still noted

## 2019-11-26 NOTE — PROGRESS NOTES
Antepartum Progress Note    No complaints.   Temp:  [98.4 °F (36.9 °C)-98.7 °F (37.1 °C)] 98.4 °F (36.9 °C)  Pulse:  [75-94] 84  Resp:  [16-18] 18  BP: (120-203)/() 163/105  FHT:  baseline 140s, moderate variability, accels appreciated, occ variable

## 2019-11-26 NOTE — PROGRESS NOTES
Report from [address] [employer] @ this time. POC discussed and will enforce. Pt stable in bed with IV infusing, Pitocin and Magnesium to be started at this time, [place] to be placed, and call light in reach. Pt verbalized understanding of POC.  Will continue to mo

## 2019-11-27 PROCEDURE — 82962 GLUCOSE BLOOD TEST: CPT

## 2019-11-27 PROCEDURE — 88307 TISSUE EXAM BY PATHOLOGIST: CPT | Performed by: OBSTETRICS & GYNECOLOGY

## 2019-11-27 RX ORDER — SIMETHICONE 80 MG
80 TABLET,CHEWABLE ORAL 3 TIMES DAILY PRN
Status: DISCONTINUED | OUTPATIENT
Start: 2019-11-27 | End: 2019-11-29

## 2019-11-27 RX ORDER — ACETAMINOPHEN 325 MG/1
650 TABLET ORAL EVERY 6 HOURS PRN
Status: DISCONTINUED | OUTPATIENT
Start: 2019-11-27 | End: 2019-11-29

## 2019-11-27 RX ORDER — ZOLPIDEM TARTRATE 5 MG/1
5 TABLET ORAL NIGHTLY PRN
Status: DISCONTINUED | OUTPATIENT
Start: 2019-11-27 | End: 2019-11-29

## 2019-11-27 RX ORDER — IBUPROFEN 600 MG/1
600 TABLET ORAL EVERY 6 HOURS
Status: DISCONTINUED | OUTPATIENT
Start: 2019-11-27 | End: 2019-11-29

## 2019-11-27 RX ORDER — BISACODYL 10 MG
10 SUPPOSITORY, RECTAL RECTAL ONCE AS NEEDED
Status: ACTIVE | OUTPATIENT
Start: 2019-11-27 | End: 2019-11-27

## 2019-11-27 RX ORDER — LABETALOL HYDROCHLORIDE 5 MG/ML
20 INJECTION, SOLUTION INTRAVENOUS ONCE
Status: COMPLETED | OUTPATIENT
Start: 2019-11-27 | End: 2019-11-27

## 2019-11-27 RX ORDER — DOCUSATE SODIUM 100 MG/1
100 CAPSULE, LIQUID FILLED ORAL
Status: DISCONTINUED | OUTPATIENT
Start: 2019-11-27 | End: 2019-11-29

## 2019-11-27 NOTE — ANESTHESIA PREPROCEDURE EVALUATION
PRE-OP EVALUATION    Patient Name: Trixie Waite    Pre-op Diagnosis: * No surgery found *    * No surgery found *    * Surgery not found *    Pre-op vitals reviewed.   Temp: 97.7 °F (36.5 °C)  Pulse: 78  Resp: 14  BP: 114/59  SpO2: 96 %  Body mass index injection 20 mg, 20 mg, Intravenous, Once PRN    And  [COMPLETED] Labetalol HCl (TRANDATE) injection 40 mg, 40 mg, Intravenous, Once PRN    And  [COMPLETED] Labetalol HCl (TRANDATE) injection 80 mg, 80 mg, Intravenous, Once PRN    And  [COMPLETED] hydrALAz diabetes  gestational,                          Pulmonary    Negative pulmonary ROS. Neuro/Psych    Negative neuro/psych ROS.                                 Past Surgical History:   Procedure Laterality Date   • OTHER SURGICAL HISTORY

## 2019-11-27 NOTE — PROGRESS NOTES
Complete bedside shift report given to Jorge Mendoza RN, and Marli Egan RN. Magnesium handoff completed.

## 2019-11-27 NOTE — PLAN OF CARE
Problem: BIRTH - VAGINAL/ SECTION  Goal: Fetal and maternal status remain reassuring during the birth process  Description  INTERVENTIONS:  - Monitor vital signs  - Monitor fetal heart rate  - Monitor uterine activity  - Monitor labor progression ordered  - Bedrest  Outcome: Progressing  Goal: Anxiety is at manageable level  Description  INTERVENTIONS:  - [place] patient to unit/surroundings  - Establish a trusting relationship with patient  - Discuss possible complications or alterations in birth p

## 2019-11-27 NOTE — ANESTHESIA PROCEDURE NOTES
Labor Analgesia  Performed by: Gurwinder Jose MD  Authorized by: Gurwinder Jose MD       General Information and Staff    Start Time:  11/26/2019 7:37 PM  Anesthesiologist:  Rosa Torres MD  Performed by:   Anesthesiologist  Patient Location:  OB  Site

## 2019-11-27 NOTE — L&D DELIVERY NOTE
35 w IOL for preeclampsia with severe range BP    This patient was induced with oxytocin and proceeded to have a vaginal birth. A , vigorous male infant was delivered. Head delivery was controlled. Fetal body delivered without difficulty.   Nares

## 2019-11-28 PROCEDURE — 85025 COMPLETE CBC W/AUTO DIFF WBC: CPT | Performed by: OBSTETRICS & GYNECOLOGY

## 2019-11-28 PROCEDURE — 80053 COMPREHEN METABOLIC PANEL: CPT | Performed by: OBSTETRICS & GYNECOLOGY

## 2019-11-28 RX ORDER — LABETALOL 100 MG/1
100 TABLET, FILM COATED ORAL ONCE
Status: COMPLETED | OUTPATIENT
Start: 2019-11-28 | End: 2019-11-28

## 2019-11-28 RX ORDER — NIFEDIPINE 30 MG/1
30 TABLET, EXTENDED RELEASE ORAL DAILY
Status: DISCONTINUED | OUTPATIENT
Start: 2019-11-28 | End: 2019-11-29

## 2019-11-28 NOTE — PROGRESS NOTES
NURSING ADMISSION NOTE    Patient admitted via wheelchair. Oriented to room. Safety precautions initiated. Bed in low position. Call light in reach. Baby in NICU. Teaching initiated.

## 2019-11-28 NOTE — PROGRESS NOTES
Labor Analgesia Follow Up Note    Patient underwent epidural anesthesia for labor analgesia,    Placenta Date/Time: 11/27/2019  5:57 AM    Delivery Date/Time[de-identified] 11/27/2019  5:49 AM    /76 (BP Location: Right arm)   Pulse 67   Temp 97.9 °F (36.6 °C) (O

## 2019-11-28 NOTE — PROGRESS NOTES
Pt rec'd in stable condition. MgSO4 checked and signed off in computer. POC rev'd with pt and pt verbalized understanding. All needs met at this time.

## 2019-11-28 NOTE — PROGRESS NOTES
[facility]  Post-Partum Vaginal Delivery Progress Note    Chava Romeo Patient Status:  Inpatient    1990 MRN [id number]   Location [address] [facility] Attending Sera Coyne MD   Hosp Day # 6 PCP Surendra Faulkner MD     SUBJECTIVE:

## 2019-11-29 VITALS
WEIGHT: 216 LBS | SYSTOLIC BLOOD PRESSURE: 137 MMHG | DIASTOLIC BLOOD PRESSURE: 92 MMHG | HEART RATE: 80 BPM | TEMPERATURE: 98 F | OXYGEN SATURATION: 97 % | BODY MASS INDEX: 40.26 KG/M2 | HEIGHT: 61.25 IN | RESPIRATION RATE: 18 BRPM

## 2019-11-29 RX ORDER — NIFEDIPINE 30 MG/1
30 TABLET, FILM COATED, EXTENDED RELEASE ORAL DAILY
Qty: 30 TABLET | Refills: 1 | Status: SHIPPED | OUTPATIENT
Start: 2019-11-30 | End: 2020-02-04

## 2019-11-29 RX ORDER — LABETALOL 200 MG/1
200 TABLET, FILM COATED ORAL EVERY 12 HOURS SCHEDULED
Qty: 60 TABLET | Refills: 1 | Status: SHIPPED | OUTPATIENT
Start: 2019-11-29 | End: 2020-01-31

## 2019-11-29 NOTE — PROGRESS NOTES
Pt discharged and ambulated to NICU with . Will go home after visiting infant in the NICU. All questions answered.

## 2019-11-29 NOTE — CM/SW NOTE
CM met with patient, Maria Eugenia Ross, and reviewed insurance and PCP for infant. Infant will be added to Sher[employer] Fakerrie PPO that patient delivered under. PCP is Sotero Goode, office phone [phone].  CM reviewed Auth process and recommended that couple  bi

## 2019-11-29 NOTE — PLAN OF CARE
Problem: SAFETY ADULT - FALL  Goal: Free from fall injury  Description  INTERVENTIONS:  - Assess pt frequently for physical needs  - Identify cognitive and physical deficits and behaviors that affect risk of falls.   - [place] fall precautions as indica knowledge and previous experience with breast feeding.  - Provide information as needed about early infant feeding cues (e.g., rooting, lip smacking, sucking fingers/hand) versus late cue of crying.  - Discuss/demonstrate breast feeding aids (e.g., infant

## 2019-11-29 NOTE — PROGRESS NOTES
Reviewed discharge instructions and saline lock removed from L forearm. Reviewed available resources after discharge home. Infant remains in NICU. Patient provided with NICU telephone number to be able to check on infant status.  Patient has a breast pump a

## 2019-11-29 NOTE — PROGRESS NOTES
Reviewed BP readings with Dr. Aida Fernandes - during the night and this am. 146/103 at  , repeat after pt had been on her L side 113/66.  146/93 at 0813, 155/106 at 0926 prior to giving procardia, repeated BP in 30 mins 147/82, after pt had been on her L side.

## 2019-11-29 NOTE — PROGRESS NOTES
Postpartum Day 2    Pt without complaints.     Temp:  [97.9 °F (36.6 °C)-98.4 °F (36.9 °C)] 98 °F (36.7 °C)  Pulse:  [70-95] 75  Resp:  [16-18] 18  BP: (113-158)/() 147/82   Patient Vitals for the past 24 hrs:   BP Temp Temp src Pulse Resp   11/29/19

## 2019-11-29 NOTE — PLAN OF CARE
Problem: SAFETY ADULT - FALL  Goal: Free from fall injury  Description  INTERVENTIONS:  - Assess pt frequently for physical needs  - Identify cognitive and physical deficits and behaviors that affect risk of falls.   - [place] fall precautions as indica knowledge and previous experience with breast feeding.  - Provide information as needed about early infant feeding cues (e.g., rooting, lip smacking, sucking fingers/hand) versus late cue of crying.  - Discuss/demonstrate breast feeding aids (e.g., infant

## 2019-12-02 ENCOUNTER — TELEPHONE (OUTPATIENT)
Dept: OBGYN UNIT | Facility: HOSPITAL | Age: 29
End: 2019-12-02

## 2019-12-04 ENCOUNTER — TELEPHONE (OUTPATIENT)
Dept: OBGYN UNIT | Facility: HOSPITAL | Age: 29
End: 2019-12-04

## 2019-12-04 NOTE — PAYOR COMM NOTE
REF: [id number]  APPROVED 11/22/19-11/27/19- FAXING CLINICAL UPDATE FOR 11/27/19-11/29/19 11/27/19  L&D Delivery Note signed by Janett Villeda MD at 11/27/2019  6:19 AM          Filed: 11/27/2019  6:19 AM Date of Service: 11/27/2019  6:18 AM BILT 0.2 11/28/2019     TP 5.7 11/28/2019     AST 15 11/28/2019     ALT 20 11/28/2019       ASSESSMENT/PLAN:  Status post vaginal delivery. PPD # 1  Severe pre eclampsia, completed 24' PP MgSo4.   BP still elevated on Labetalol, will add Procardia, if no re

## 2019-12-13 NOTE — ANESTHESIA PROCEDURE NOTES
Peripheral IV  Date/Time: 11/25/2019 8:00 PM  Inserted by:  Erica Jose MD    Placement  Needle size: 22 G  Laterality: right  Location: arm  Local anesthetic: none  Site prep: alcohol  Technique: ultrasound guided  Attempts: 2

## 2020-01-16 ENCOUNTER — NURSE ONLY (OUTPATIENT)
Dept: LACTATION | Facility: HOSPITAL | Age: 30
End: 2020-01-16
Attending: OBSTETRICS & GYNECOLOGY
Payer: COMMERCIAL

## 2020-01-16 PROCEDURE — 99213 OFFICE O/P EST LOW 20 MIN: CPT

## 2020-02-04 PROBLEM — Z34.81 PRENATAL CARE, SUBSEQUENT PREGNANCY, FIRST TRIMESTER: Status: RESOLVED | Noted: 2019-06-04 | Resolved: 2020-02-04

## 2020-02-04 PROBLEM — Z34.90 PREGNANCY: Status: RESOLVED | Noted: 2019-11-22 | Resolved: 2020-02-04

## 2020-02-04 PROBLEM — O24.419 GESTATIONAL DIABETES: Status: RESOLVED | Noted: 2019-10-21 | Resolved: 2020-02-04

## 2020-04-20 ENCOUNTER — TELEPHONE (OUTPATIENT)
Dept: INTERNAL MEDICINE CLINIC | Facility: CLINIC | Age: 30
End: 2020-04-20

## 2020-04-20 NOTE — TELEPHONE ENCOUNTER
Patient called stating she delivered early in November at 29 weeks pregnant and was diagnosed with preeclampsia. She has been on Labetalol 200 mg BID and states that she needs to continue care regarding her blood pressure with her PCP.      Her BP has b

## 2020-04-28 ENCOUNTER — VIRTUAL PHONE E/M (OUTPATIENT)
Dept: INTERNAL MEDICINE CLINIC | Facility: CLINIC | Age: 30
End: 2020-04-28
Payer: COMMERCIAL

## 2020-04-28 DIAGNOSIS — I10 ESSENTIAL HYPERTENSION: Primary | ICD-10-CM

## 2020-04-28 PROCEDURE — 99212 OFFICE O/P EST SF 10 MIN: CPT | Performed by: INTERNAL MEDICINE

## 2020-04-28 RX ORDER — LABETALOL 200 MG/1
200 TABLET, FILM COATED ORAL EVERY 12 HOURS SCHEDULED
Qty: 180 TABLET | Refills: 0 | Status: SHIPPED | OUTPATIENT
Start: 2020-04-28 | End: 2020-07-06

## 2020-04-28 NOTE — PROGRESS NOTES
HPI:    Patient ID: Lisa Spangler is a 31year old female. She verbally consents to a Virtual/Telephone Check-In visit on 04/30/20.     Patient understands and accepts financial responsibility for any deductible, co-insurance and/or co-pays associated wi and sleep disturbance. The patient is not nervous/anxious. Current Outpatient Medications   Medication Sig Dispense Refill   • Labetalol HCl 200 MG Oral Tab Take 1 tablet (200 mg total) by mouth 2 (two) times daily.  60 tablet 0   • Labetalol HCl 2 speaks in full sentences , AOAX3  ASSESSMENT/PLAN:   htn- controlled , continue with current medication , I will send refill , check blood pressure at home and call us with bp readings within 2 weeks , watch diet , avoid salty food ,  Time 9 min    No orde

## 2020-07-05 ENCOUNTER — TELEPHONE (OUTPATIENT)
Dept: INTERNAL MEDICINE CLINIC | Facility: CLINIC | Age: 30
End: 2020-07-05

## 2020-07-06 RX ORDER — LABETALOL 200 MG/1
TABLET, FILM COATED ORAL
Qty: 180 TABLET | Refills: 0 | Status: SHIPPED | OUTPATIENT
Start: 2020-07-06 | End: 2021-02-15

## 2020-07-06 NOTE — TELEPHONE ENCOUNTER
From: Rogelio Kunz  To: Mayuri Beltran MD  Sent: 7/5/2020  9:03 PM CDT  Subject: Non-Urgent Medical Question    I have been treating my eczema at home with creams and hydrocortisone for a while. It flared up again during quarantine.  I believe it is dish

## 2020-07-13 ENCOUNTER — OFFICE VISIT (OUTPATIENT)
Dept: INTERNAL MEDICINE CLINIC | Facility: CLINIC | Age: 30
End: 2020-07-13
Payer: COMMERCIAL

## 2020-07-13 ENCOUNTER — MED REC SCAN ONLY (OUTPATIENT)
Dept: INTERNAL MEDICINE CLINIC | Facility: CLINIC | Age: 30
End: 2020-07-13

## 2020-07-13 VITALS
OXYGEN SATURATION: 99 % | HEART RATE: 90 BPM | BODY MASS INDEX: 35.12 KG/M2 | DIASTOLIC BLOOD PRESSURE: 85 MMHG | WEIGHT: 186 LBS | HEIGHT: 61 IN | SYSTOLIC BLOOD PRESSURE: 135 MMHG | TEMPERATURE: 98 F

## 2020-07-13 DIAGNOSIS — L30.4 ECZEMA INTERTRIGO: Primary | ICD-10-CM

## 2020-07-13 PROCEDURE — 99214 OFFICE O/P EST MOD 30 MIN: CPT | Performed by: INTERNAL MEDICINE

## 2020-07-13 NOTE — PROGRESS NOTES
HPI:    Patient ID: Jerald Petersen is a 31year old female. HPI she came today complaining of rash on her  Right hand . According to her this is ongoing for some time and is not getting any better.   She is using different over-the-counter medication agitation, behavioral problems, confusion, hallucinations and sleep disturbance. The patient is not nervous/anxious.           Current Outpatient Medications   Medication Sig Dispense Refill   • mupirocin 2 % External Ointment Apply to affected  Are bid 1 T Normocephalic and atraumatic. Right Ear: Tympanic membrane and external ear normal. No drainage or tenderness. No mastoid tenderness. Tympanic membrane is not erythematous. Left Ear: Tympanic membrane and external ear normal. No drainage or tenderness. Skin: Skin is warm, dry and intact. No rash noted. No cyanosis or erythema. Nails show no clubbing. exzema of the right hand - minimal redness , peeling skin   Psychiatric: She has a normal mood and affect. Her behavior is normal.   Vitals reviewed.

## 2021-02-15 RX ORDER — LABETALOL 200 MG/1
TABLET, FILM COATED ORAL
Qty: 180 TABLET | Refills: 3 | Status: SHIPPED | OUTPATIENT
Start: 2021-02-15 | End: 2022-01-13

## 2022-01-13 RX ORDER — LABETALOL 200 MG/1
200 TABLET, FILM COATED ORAL EVERY 12 HOURS
Qty: 180 TABLET | Refills: 0 | Status: SHIPPED | OUTPATIENT
Start: 2022-01-13

## 2022-01-13 NOTE — TELEPHONE ENCOUNTER
Please review. Protocol Failed / No Protocol.     Requested Prescriptions   Pending Prescriptions Disp Refills    LABETALOL 200 MG Oral Tab [Pharmacy Med Name: LABETALOL  200MG  TAB] 180 tablet 3     Sig: TAKE 1 TABLET BY MOUTH  EVERY 12 HOURS        Hyper

## (undated) NOTE — LETTER
[address]  [facility] [place]       02/23/18        Patient: William Signs   YOB: 1990   Date of Visit: 2/23/2018       Dear  Dr. Norma Marquez MD,      Thank you for referring William Signs to my practice.   Please find my

## (undated) NOTE — MR AVS SNAPSHOT
[place]  [address]  [phone]               Thank you for choosing us for your health care visit with [facility], .   We are glad to serve you and happy to provide you with this summary of Feb 14, 2017  3:50 PM   Return OB with Marta Jara, [address] [facility], [address] [place] [address] [place] ([place])    [address]   [phone]            Feb 28, 2017  3:40 PM   Ret your doctor or other care provider to review them with you.             Follow-up Instructions     Return in about 1 year (around 1/10/2018) for [address] [place] can access your MyChart to more actively manage your health car

## (undated) NOTE — IP AVS SNAPSHOT
[address] [facility] [phone]                Discharge Summary   3/19/2017    Mary Kate Villasenor           Admission Information        Provider Department    3/19/2017 Zayra Anderson MD [facility] 3se Generic drug:  Acetone (Urine) Test                     Patient Instructions       Pelvic rest. Control pain with oral pain medication. May shower no bath.        Preeclampsia/Hypertension       Preeclampsia is a serious disease related to high blood pressu Recent Hematology Lab Results (cont.)  (Last 3 results in the past 90 days)    Neutrophil % Lymphocyte % Monocyte % Eosinophil % Basophil % Prelim Neut Abs Final Neut Abs Lymphocyte Abso Monocyte Absolu Eosinophil Abso Basophil Absolu    (03/22/17)  71 (03 after two weeks postpartum. Call your doctor if you have excessive bleeding.   More than one pad per hour  Call Lactation Department with any questions/concerns [phone]      In the next few weeks you may be mailed a survey from us asking you to rate y

## (undated) NOTE — Clinical Note
Twice weekly NST at 34 wks Growth ultrasound in 3 weeks If SBP > 155 or DBP >95 then start antihypertensive medications Continue insulin as noted above Plan delivery between 38-39 weeks for diabetes and hypertension Send bile acid level due to itching

## (undated) NOTE — Clinical Note
1/31/2017              [address] [address]         Dear Davon Keith will need to call to schedule your weekly NST's with the [facility] starting at 32 weeks.  The phone number for you to kylie

## (undated) NOTE — IP AVS SNAPSHOT
[address]  [address] [facility], [facility], [facility] ~ [phone]                Discharge Summary   1/30/2017    Armani [place]           Admission Information        Provider Department    1/30/2017 Zeb Gonzalez MD [facility] Maternal F Commonly known as:  HUMALOG        Inject 2 Units into the skin Before Dinner.     Leta Taylor     [    ]    [    ]    [    ]    [    ]       Insulin NPH (Human) (Isophane) 100 UNIT/ML Susp   Commonly known as:  Nesha Del Rosario N        Call[facility] Lori [facility], [address] [facility] [place] ([place])    [address]   [phone]            Mar 17, 2017 11:00 AM   Return OB with Teresa Saab MD   [facility] Immun[facility] History as of 1/30/2017  Never Reviewed    INFLUENZA 11/29/2016    TDAP 2/10/2015      Recent Hematology Lab Results  (Last 3 results in the past 90 days)    WBC RBC Hemoglobin Hematocrit MCV MCH MCHC RDW Platelet MPV    (30/04/05)  14.8 (H) ( Call [phone] for help. [employer] is NOT to be used for urgent needs.   For medical emergencies, dial 911.             _____________________________________________________________________________    Medication Side Effects - Medications to be taken at

## (undated) NOTE — Clinical Note
IUP at 30w1d   Normal fetal growth and anatomy Maternal obesity complicating pregnancy (BMI 38.9) Hypertension, stable without medicaitons Diabetes- probably preexisting.  Good control on NPH insulin at HS and a small dose of Humalog at dinner Posterior pl

## (undated) NOTE — IP AVS SNAPSHOT
Patient Demographics     Address Phone E-mail Address    [address] [facility]  [address]  (Home) *Preferred*  [phone] [facility] Lucio Godfrey. [email or web address]      Emergency Contact(s)     Name Relation Home Work Mobile    Jim Barnes [    ]    [    ]       Insulin NPH (Human) (Isophane) 100 UNIT/ML Susp   Commonly known as:  HUMULIN N,NOVOLIN N        Inject 16 Units into the skin nightly. DOSAGE IS LIKELY TO CHANGE TWICE WEEKLY.    OK TO GIVE NOVALIN NPH IF HER INSURANCE COVERS IT INS 3/3/2017 5:30 PM [facility] NST TRIAGE RM [address] [facility]    3/7/2017 4:30 PM [facility] [place] [facility]    3/10/2017 5:30 PM [facility] [place] [facility] Brenda Pennington [address] [place] [address]

## (undated) NOTE — IP AVS SNAPSHOT
[address]  [address] [facility] [phone]                Discharge Summary   3/14/2017    Moris Zarate           Admission Information        Provider Department    3/14/2017 Cynthia Al MD [facility] Maternal F Commonly known as:  HUMALOG        Inject 2 Units into the skin Before Dinner.     Kb Olvera     [    ]    [    ]    [    ]    [    ]       Insulin NPH (Human) (Isophane) 100 UNIT/ML Susp   Commonly known as:  Abe MIN        Called in Lori Office Visit with [facility],First Floor Maternal Fetal Medicine ([address])    [address]  [address] [address]   [phone]            Mar 24, 2017  3:40 PM   Return OB with Kim Carroll 275 (03/07/17)  8.2      Recent Hematology Lab Results (cont.)  (Last 3 results in the past 90 days)    Neutrophil % Lymphocyte % Monocyte % Eosinophil % Basophil % Prelim Neut Abs Final Neut Abs Lymphocyte Abso Monocyte Absolu Eosinophil Abso Basophil Abs Medicaid plans. To get signed up and covered, please call [phone] and ask to get set up for an insurance coverage that is in-network with Yaima Schaeffer.         MyChart     Visit 9+  You can access your MyChart to more actively manage twice a week or high blood sugar (greater than 200) for more than 2-3 days           All Other Medications     Insulin Syringe-Needle U-100 (BD INSULIN SYRINGE) 31G X 5/16\" 0.3 ML Does not apply Misc    ONETOUCH DELICA LANCETS 99Q Does not apply Misc    O

## (undated) NOTE — Clinical Note
VACCINE ADMINISTRATION RECORD  PARENT / GUARDIAN APPROVAL  Date: 2017  Vaccine administered to: Lashawn Blackmon     : 1990    MRN: [id number]    A copy of the appropriate Centers for Disease Control and Prevention Vaccine Information statement has

## (undated) NOTE — Clinical Note
Twice weekly  testing (Weekly BPP  and NST Friday) Delivery is indicated at 37 weeks or sooner for neurologic symptoms or  or greater or  or greater.   She knows to call her OB for HA, visual changes,  or  or gre

## (undated) NOTE — LETTER
Dear new mom:    We've missed you! The nurses of [facility] have tried to reach you by phone to ask if you had any questions regarding your health or the care of your new little one.     Please feel free to call your doctor with an

## (undated) NOTE — IP AVS SNAPSHOT
Patient Demographics     Address Phone E-mail Address    [address] [facility]  [address]  (Home) *Preferred*  [phone] [facility] Lucio Godfrey. [email or web address]      Emergency Contact(s)     Name Relation Home Work Mobile    Jim Barnes [    ]    [    ]       Insulin NPH (Human) (Isophane) 100 UNIT/ML Susp   Commonly known as:  HUMULIN N,NOVOLIN N        Inject 16 Units into the skin nightly. DOSAGE IS LIKELY TO CHANGE TWICE WEEKLY.    OK TO GIVE NOVALIN NPH IF HER INSURANCE COVERS IT INS 1/30/2017 4:40 PM Michael Anderson MD [facility] [place], [address], [address] [place] [address] OF THE [facility]    2/14/2017 3:50 PM Jonny Alves [address] [place] [address], [address], [facility] OF THE [facility]    2/27/2017 9:00 AM [address] [place] [address]

## (undated) NOTE — IP AVS SNAPSHOT
[address], [facility], [facility] ~ [phone]                Discharge Summary   2/28/2017    Susan Barreto           Admission Information        Provider Department    2/28/2017 Rosa Wetzel MD [facility] Maternal F Commonly known as:  HUMALOG        Inject 2 Units into the skin Before Dinner.     Rodriguez Wilson     [    ]    [    ]    [    ]    [    ]       Insulin NPH (Human) (Isophane) 100 UNIT/ML Susp   Commonly known as:  Magalene Dust N        Called in Lori Mar 10, 2017  5:30 PM   [address] Non Stress Test with [facility] [place] NST TRIAGE [facility] ([address])    [facility].   [address] [address] [phone]            Mar 17, 201 [facility]    It is the patient's responsibility to check with and follow their insurance company's guidelines for prior authorization for this test.  You may be held responsible for payment in full if If you've recently had a stay at the Hospital you can access your discharge instructions in [employer] by going to Visits < Admission Summaries.  If you've been to the Emergency Department or your doctor's office, you can view your past visit information in My

## (undated) NOTE — IP AVS SNAPSHOT
[address]  [address] [facility] [phone]                Discharge Summary   3/7/2017    Irasema Weinstein           Admission Information        Provider Department    3/7/2017 Roxanne Arenas MD [facility] 3e C-D      Why Last time this was given:  26 Units on 3/7/2017 10:52 PM   Commonly known as:  YUAN [address] 119 in Pharmacist, Alban Duffy. Called in Novolin NPH vial. Sig: Take 10 units of NPH at bedtime. Subject to change the dosage. (Pharmacist states that No · Feeling nauseated or vomiting  · Stomach pain/heartburn  · Swelling in your hands, feet or face  · Sudden weight gain  · Shortness of breath  · \"Just not feeling right\"        Follow-up Information     Follow up with Jonny Rubi MD. Go in 2 days. [address] [place] [address]   [phone]            Mar 24, 2017  2:00 PM   Office Visit with Rocio Elmore Maternal Fetal Medicine ([address])    [place] [address]  [address] [address]   [phone]            Mar Recent Hematology Lab Results (cont.)  (Last 3 results in the past 90 days)    Neutrophil % Lymphocyte % Monocyte % Eosinophil % Basophil % Prelim Neut Abs Final Neut Abs Lymphocyte Abso Monocyte Absolu Eosinophil Abso Basophil Absolu    (03/08/17)  54 (03 Patient [address] to help you get signed up for insurance coverage. Patient [address] is a Federal Navigator program that can help with your Affordable Care Act coverage, as well as all types of Medicaid plans.   To get signed up and covere